# Patient Record
Sex: FEMALE | Race: WHITE | Employment: FULL TIME | URBAN - NONMETROPOLITAN AREA
[De-identification: names, ages, dates, MRNs, and addresses within clinical notes are randomized per-mention and may not be internally consistent; named-entity substitution may affect disease eponyms.]

---

## 2017-01-31 ENCOUNTER — OFFICE VISIT (OUTPATIENT)
Dept: CARDIOLOGY | Age: 52
End: 2017-01-31
Payer: MEDICAID

## 2017-01-31 VITALS
SYSTOLIC BLOOD PRESSURE: 110 MMHG | DIASTOLIC BLOOD PRESSURE: 70 MMHG | HEART RATE: 70 BPM | WEIGHT: 158 LBS | HEIGHT: 65 IN | BODY MASS INDEX: 26.33 KG/M2

## 2017-01-31 DIAGNOSIS — I47.1 AVNRT (AV NODAL RE-ENTRY TACHYCARDIA) (HCC): Primary | ICD-10-CM

## 2017-01-31 PROCEDURE — 99212 OFFICE O/P EST SF 10 MIN: CPT | Performed by: INTERNAL MEDICINE

## 2017-04-24 ENCOUNTER — APPOINTMENT (OUTPATIENT)
Dept: MAMMOGRAPHY | Facility: HOSPITAL | Age: 52
End: 2017-04-24

## 2017-04-25 ENCOUNTER — HOSPITAL ENCOUNTER (OUTPATIENT)
Dept: MAMMOGRAPHY | Facility: HOSPITAL | Age: 52
Discharge: HOME | End: 2017-04-25
Admitting: PHYSICIAN ASSISTANT

## 2017-04-25 ENCOUNTER — TELEPHONE (OUTPATIENT)
Dept: SURGERY | Age: 52
End: 2017-04-25

## 2017-04-25 DIAGNOSIS — Z13.9 SCREENING: ICD-10-CM

## 2017-04-25 PROCEDURE — G0202 SCR MAMMO BI INCL CAD: HCPCS

## 2017-04-25 PROCEDURE — 77063 BREAST TOMOSYNTHESIS BI: CPT

## 2017-05-01 ENCOUNTER — OFFICE VISIT (OUTPATIENT)
Dept: SURGERY | Age: 52
End: 2017-05-01
Payer: MEDICAID

## 2017-05-01 VITALS — SYSTOLIC BLOOD PRESSURE: 120 MMHG | DIASTOLIC BLOOD PRESSURE: 74 MMHG | HEART RATE: 72 BPM

## 2017-05-01 DIAGNOSIS — Z12.31 VISIT FOR SCREENING MAMMOGRAM: ICD-10-CM

## 2017-05-01 PROCEDURE — 99212 OFFICE O/P EST SF 10 MIN: CPT | Performed by: PHYSICIAN ASSISTANT

## 2017-08-28 ENCOUNTER — OFFICE VISIT (OUTPATIENT)
Dept: CARDIOLOGY | Age: 52
End: 2017-08-28
Payer: MEDICAID

## 2017-08-28 VITALS
SYSTOLIC BLOOD PRESSURE: 118 MMHG | DIASTOLIC BLOOD PRESSURE: 80 MMHG | WEIGHT: 176 LBS | HEART RATE: 70 BPM | HEIGHT: 67 IN | BODY MASS INDEX: 27.62 KG/M2

## 2017-08-28 DIAGNOSIS — I47.1 AVNRT (AV NODAL RE-ENTRY TACHYCARDIA) (HCC): Primary | ICD-10-CM

## 2017-08-28 PROCEDURE — 99213 OFFICE O/P EST LOW 20 MIN: CPT | Performed by: CLINICAL NURSE SPECIALIST

## 2017-08-28 ASSESSMENT — ENCOUNTER SYMPTOMS
ORTHOPNEA: 0
BLURRED VISION: 0
NAUSEA: 0
VOMITING: 0
SHORTNESS OF BREATH: 0
HEARTBURN: 0
COUGH: 0

## 2018-02-28 ENCOUNTER — OFFICE VISIT (OUTPATIENT)
Dept: CARDIOLOGY | Age: 53
End: 2018-02-28
Payer: MEDICAID

## 2018-02-28 ENCOUNTER — TELEPHONE (OUTPATIENT)
Dept: CARDIOLOGY | Age: 53
End: 2018-02-28

## 2018-02-28 VITALS
BODY MASS INDEX: 29.41 KG/M2 | DIASTOLIC BLOOD PRESSURE: 68 MMHG | HEIGHT: 66 IN | WEIGHT: 183 LBS | HEART RATE: 45 BPM | SYSTOLIC BLOOD PRESSURE: 122 MMHG

## 2018-02-28 DIAGNOSIS — R00.1 BRADYCARDIA: ICD-10-CM

## 2018-02-28 DIAGNOSIS — I47.1 AVNRT (AV NODAL RE-ENTRY TACHYCARDIA) (HCC): Primary | ICD-10-CM

## 2018-02-28 DIAGNOSIS — R00.2 PALPITATIONS: ICD-10-CM

## 2018-02-28 PROCEDURE — G8427 DOCREV CUR MEDS BY ELIG CLIN: HCPCS | Performed by: CLINICAL NURSE SPECIALIST

## 2018-02-28 PROCEDURE — 99214 OFFICE O/P EST MOD 30 MIN: CPT | Performed by: CLINICAL NURSE SPECIALIST

## 2018-02-28 PROCEDURE — G8484 FLU IMMUNIZE NO ADMIN: HCPCS | Performed by: CLINICAL NURSE SPECIALIST

## 2018-02-28 PROCEDURE — 1036F TOBACCO NON-USER: CPT | Performed by: CLINICAL NURSE SPECIALIST

## 2018-02-28 PROCEDURE — G8419 CALC BMI OUT NRM PARAM NOF/U: HCPCS | Performed by: CLINICAL NURSE SPECIALIST

## 2018-02-28 PROCEDURE — 3017F COLORECTAL CA SCREEN DOC REV: CPT | Performed by: CLINICAL NURSE SPECIALIST

## 2018-02-28 PROCEDURE — 3014F SCREEN MAMMO DOC REV: CPT | Performed by: CLINICAL NURSE SPECIALIST

## 2018-02-28 PROCEDURE — 93000 ELECTROCARDIOGRAM COMPLETE: CPT | Performed by: CLINICAL NURSE SPECIALIST

## 2018-02-28 ASSESSMENT — ENCOUNTER SYMPTOMS
BLURRED VISION: 0
HEARTBURN: 0
ORTHOPNEA: 0
NAUSEA: 0
SHORTNESS OF BREATH: 0
VOMITING: 0
COUGH: 0

## 2018-02-28 NOTE — PATIENT INSTRUCTIONS
good idea to know your test results and keep a list of the medicines you take. How can you care for yourself at home? · Be safe with medicines. Take your medicines exactly as prescribed. Call your doctor if you think you are having a problem with your medicine. You will get more details on the specific medicines your doctor prescribes. · If your doctor showed you how to do vagal maneuvers, try them when you have an episode. These maneuvers include bearing down or putting an ice-cold, wet towel on your face. · Monitor your condition by keeping a diary of your SVT episodes. Bring this to your doctor appointments. ¨ Write down how fast or slow your heart was beating. To count your heart rate:  § Gently place 2 fingers of your hand on the inside of your other wrist, below your thumb. § Count the beats for 30 seconds. § Then, double the result to get the number of beats per minute. ¨ Write down if your heart rhythm was regular or irregular. ¨ Write down the symptoms you had. ¨ Write down the time of day your symptoms occurred. ¨ Write down how long your symptoms lasted. ¨ Write down what you were doing when your symptoms started. ¨ Write down what may have helped your symptoms go away. · If they trigger episodes, limit or avoid alcohol or drinks with caffeine. · Do not use over-the-counter decongestants, herbal remedies, diet pills, or \"pep\" pills, which often contain stimulants. · Do not use illegal drugs, such as cocaine, ecstasy, or methamphetamine, which can speed up your heart's rhythm. · Do not smoke. Smoking can make this condition worse. If you need help quitting, talk to your doctor about stop-smoking programs and medicines. These can increase your chances of quitting for good. · Be alert for new or worsening symptoms, such as shortness of breath, pounding of your heart, or unusual tiredness. If new symptoms develop or your symptoms become worse, call your doctor.   When should you call for

## 2018-03-21 ENCOUNTER — TRANSCRIBE ORDERS (OUTPATIENT)
Dept: ADMINISTRATIVE | Facility: HOSPITAL | Age: 53
End: 2018-03-21

## 2018-03-21 ENCOUNTER — TELEPHONE (OUTPATIENT)
Dept: SURGERY | Age: 53
End: 2018-03-21

## 2018-03-21 DIAGNOSIS — Z12.31 ENCOUNTER FOR SCREENING MAMMOGRAM FOR MALIGNANT NEOPLASM OF BREAST: Primary | ICD-10-CM

## 2018-04-16 ENCOUNTER — HOSPITAL ENCOUNTER (OUTPATIENT)
Dept: CT IMAGING | Facility: HOSPITAL | Age: 53
Discharge: HOME | End: 2018-04-16
Admitting: NURSE PRACTITIONER

## 2018-04-16 LAB — CREAT BLDA-MCNC: 0.7 MG/DL (ref 0.6–1.3)

## 2018-04-16 PROCEDURE — 25010000002 IOPAMIDOL 61 % SOLUTION: Performed by: NURSE PRACTITIONER

## 2018-04-16 PROCEDURE — 82565 ASSAY OF CREATININE: CPT

## 2018-04-16 PROCEDURE — 74178 CT ABD&PLV WO CNTR FLWD CNTR: CPT

## 2018-04-16 RX ADMIN — IOPAMIDOL 100 ML: 612 INJECTION, SOLUTION INTRAVENOUS at 12:15

## 2018-04-26 ENCOUNTER — OFFICE VISIT (OUTPATIENT)
Dept: SURGERY | Age: 53
End: 2018-04-26
Payer: MEDICAID

## 2018-04-26 ENCOUNTER — HOSPITAL ENCOUNTER (OUTPATIENT)
Dept: MAMMOGRAPHY | Facility: HOSPITAL | Age: 53
Discharge: HOME | End: 2018-04-26
Admitting: PHYSICIAN ASSISTANT

## 2018-04-26 VITALS
BODY MASS INDEX: 28.73 KG/M2 | SYSTOLIC BLOOD PRESSURE: 100 MMHG | WEIGHT: 178.8 LBS | DIASTOLIC BLOOD PRESSURE: 60 MMHG | HEIGHT: 66 IN

## 2018-04-26 DIAGNOSIS — Z12.39 SCREENING BREAST EXAMINATION: Primary | ICD-10-CM

## 2018-04-26 DIAGNOSIS — Z12.31 ENCOUNTER FOR SCREENING MAMMOGRAM FOR MALIGNANT NEOPLASM OF BREAST: ICD-10-CM

## 2018-04-26 PROCEDURE — 77067 SCR MAMMO BI INCL CAD: CPT

## 2018-04-26 PROCEDURE — G8419 CALC BMI OUT NRM PARAM NOF/U: HCPCS | Performed by: PHYSICIAN ASSISTANT

## 2018-04-26 PROCEDURE — 77063 BREAST TOMOSYNTHESIS BI: CPT

## 2018-04-26 PROCEDURE — 1036F TOBACCO NON-USER: CPT | Performed by: PHYSICIAN ASSISTANT

## 2018-04-26 PROCEDURE — 3017F COLORECTAL CA SCREEN DOC REV: CPT | Performed by: PHYSICIAN ASSISTANT

## 2018-04-26 PROCEDURE — 99212 OFFICE O/P EST SF 10 MIN: CPT | Performed by: PHYSICIAN ASSISTANT

## 2018-04-26 PROCEDURE — G8427 DOCREV CUR MEDS BY ELIG CLIN: HCPCS | Performed by: PHYSICIAN ASSISTANT

## 2018-04-30 DIAGNOSIS — Z12.31 VISIT FOR SCREENING MAMMOGRAM: ICD-10-CM

## 2018-05-07 ENCOUNTER — TELEPHONE (OUTPATIENT)
Dept: CARDIOLOGY | Age: 53
End: 2018-05-07

## 2018-05-16 ENCOUNTER — OFFICE VISIT (OUTPATIENT)
Dept: CARDIOLOGY | Age: 53
End: 2018-05-16
Payer: MEDICAID

## 2018-05-16 VITALS
WEIGHT: 177 LBS | HEART RATE: 54 BPM | BODY MASS INDEX: 28.45 KG/M2 | DIASTOLIC BLOOD PRESSURE: 78 MMHG | HEIGHT: 66 IN | SYSTOLIC BLOOD PRESSURE: 120 MMHG

## 2018-05-16 DIAGNOSIS — R00.2 PALPITATIONS: Primary | ICD-10-CM

## 2018-05-16 DIAGNOSIS — I47.1 AVNRT (AV NODAL RE-ENTRY TACHYCARDIA) (HCC): ICD-10-CM

## 2018-05-16 PROCEDURE — 3017F COLORECTAL CA SCREEN DOC REV: CPT | Performed by: NURSE PRACTITIONER

## 2018-05-16 PROCEDURE — G8427 DOCREV CUR MEDS BY ELIG CLIN: HCPCS | Performed by: NURSE PRACTITIONER

## 2018-05-16 PROCEDURE — 99213 OFFICE O/P EST LOW 20 MIN: CPT | Performed by: NURSE PRACTITIONER

## 2018-05-16 PROCEDURE — 1036F TOBACCO NON-USER: CPT | Performed by: NURSE PRACTITIONER

## 2018-05-16 PROCEDURE — G8419 CALC BMI OUT NRM PARAM NOF/U: HCPCS | Performed by: NURSE PRACTITIONER

## 2018-05-16 PROCEDURE — 93000 ELECTROCARDIOGRAM COMPLETE: CPT | Performed by: NURSE PRACTITIONER

## 2018-05-30 ENCOUNTER — TELEPHONE (OUTPATIENT)
Dept: CARDIOLOGY | Age: 53
End: 2018-05-30

## 2018-06-15 ENCOUNTER — TELEPHONE (OUTPATIENT)
Dept: CARDIOLOGY | Age: 53
End: 2018-06-15

## 2018-06-28 ENCOUNTER — TELEPHONE (OUTPATIENT)
Dept: CARDIOLOGY | Age: 53
End: 2018-06-28

## 2018-06-29 DIAGNOSIS — R00.2 PALPITATIONS: ICD-10-CM

## 2018-06-29 DIAGNOSIS — I47.1 AVNRT (AV NODAL RE-ENTRY TACHYCARDIA) (HCC): ICD-10-CM

## 2018-07-10 ENCOUNTER — TELEPHONE (OUTPATIENT)
Dept: CARDIOLOGY | Age: 53
End: 2018-07-10

## 2018-08-28 ENCOUNTER — HOSPITAL ENCOUNTER (OUTPATIENT)
Dept: GENERAL RADIOLOGY | Facility: HOSPITAL | Age: 53
Discharge: HOME | End: 2018-08-28
Admitting: NURSE PRACTITIONER

## 2018-08-28 PROCEDURE — 85379 FIBRIN DEGRADATION QUANT: CPT | Performed by: NURSE PRACTITIONER

## 2018-08-28 PROCEDURE — 73562 X-RAY EXAM OF KNEE 3: CPT

## 2018-08-29 ENCOUNTER — HOSPITAL ENCOUNTER (OUTPATIENT)
Dept: ULTRASOUND IMAGING | Facility: HOSPITAL | Age: 53
Discharge: HOME | End: 2018-08-29
Admitting: NURSE PRACTITIONER

## 2018-08-29 DIAGNOSIS — M25.562 ACUTE PAIN OF LEFT KNEE: ICD-10-CM

## 2018-08-29 PROCEDURE — 93971 EXTREMITY STUDY: CPT

## 2018-09-05 ENCOUNTER — TELEPHONE (OUTPATIENT)
Dept: CARDIOLOGY | Age: 53
End: 2018-09-05

## 2018-10-09 ENCOUNTER — OFFICE VISIT (OUTPATIENT)
Dept: CARDIOLOGY | Facility: CLINIC | Age: 53
End: 2018-10-09

## 2018-10-09 VITALS
HEART RATE: 48 BPM | HEIGHT: 67 IN | DIASTOLIC BLOOD PRESSURE: 71 MMHG | SYSTOLIC BLOOD PRESSURE: 100 MMHG | BODY MASS INDEX: 28.25 KG/M2 | WEIGHT: 180 LBS

## 2018-10-09 DIAGNOSIS — I47.1 SVT (SUPRAVENTRICULAR TACHYCARDIA) (HCC): Primary | ICD-10-CM

## 2018-10-09 PROBLEM — I47.10 SVT (SUPRAVENTRICULAR TACHYCARDIA): Status: ACTIVE | Noted: 2018-10-09

## 2018-10-09 PROCEDURE — 99204 OFFICE O/P NEW MOD 45 MIN: CPT | Performed by: INTERNAL MEDICINE

## 2018-10-09 PROCEDURE — 93000 ELECTROCARDIOGRAM COMPLETE: CPT | Performed by: INTERNAL MEDICINE

## 2018-10-09 NOTE — PROGRESS NOTES
Konrad Mock is a 53 y.o. female. - REFERRED by pcp with SVT and request to change cardiologist    History of Present Illness     PAR SVT:  Has had this since at least '16 when she first presented to  with SVT and had Adenosine IVP in the ER which quickly restored sinus rhythm. Subsequent stress testing was neg and she has been on Metoprolol since. 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 Still having palpitations 2-3X/wk for short duration and recent long-term monitoring showed continued short bursts of svt and some short runs of wide-complex beats. She knows to avoid stimulants. Her dose of Metoprolol has been increased but she feels that this is the cause of her worsening fatigue and slow HR. Today's EKG is sinus sandip at 48 bpm. Her thyroid levels are in the normal range by report.    The following portions of the patient's history were reviewed and updated as appropriate: allergies, current medications, past family history, past medical history, past social history, past surgical history and problem list.    Patient Active Problem List   Diagnosis   • Colon cancer screening   • SVT (supraventricular tachycardia) (CMS/HCC)       No Known Allergies    Family History   Problem Relation Age of Onset   • Crohn's disease Mother    • Irritable bowel syndrome Mother    • Cancer Father    • Breast cancer Maternal Aunt    • Celiac disease Neg Hx    • Cirrhosis Neg Hx    • Colon cancer Neg Hx    • Esophageal cancer Neg Hx    • Liver cancer Neg Hx    • Liver disease Neg Hx    • Rectal cancer Neg Hx    • Stomach cancer Neg Hx        Social History     Social History   • Marital status:      Spouse name: N/A   • Number of children: N/A   • Years of education: N/A     Occupational History   • Not on file.     Social History Main Topics   • Smoking status: Never Smoker   • Smokeless tobacco: Never Used   • Alcohol use Yes      Comment: rare   • Drug use: No   • Sexual activity: Not on  file     Other Topics Concern   • Not on file     Social History Narrative   • No narrative on file         Current Outpatient Prescriptions:   •  Biotin 7500 MCG tablet, Take  by mouth., Disp: , Rfl:   •  Calcium Carbonate-Vit D-Min (CALTRATE 600+D PLUS PO), Take 600 mg by mouth daily., Disp: , Rfl:   •  Glucosamine-Chondroit-Vit C-Mn (GLUCOSAMINE 1500 COMPLEX) capsule, Take 400 mg by mouth daily., Disp: , Rfl:   •  levothyroxine (SYNTHROID, LEVOTHROID) 50 MCG tablet, Take 50 mcg by mouth daily., Disp: , Rfl:   •  loratadine (CLARITIN) 10 MG tablet, Take 10 mg by mouth daily., Disp: , Rfl:   •  MethylPREDNISolone (MEDROL, SHALOM,) 4 MG tablet, Take as directed on package instructions., Disp: 21 tablet, Rfl: 0  •  metoprolol tartrate (LOPRESSOR) 25 MG tablet, Take 37.5 mg by mouth 2 (Two) Times a Day., Disp: , Rfl:   •  mometasone (NASONEX) 50 MCG/ACT nasal spray, 2 sprays into each nostril daily., Disp: , Rfl:   •  sertraline (ZOLOFT) 50 MG tablet, Take 50 mg by mouth daily., Disp: , Rfl:     Past Surgical History:   Procedure Laterality Date   • BREAST BIOPSY Bilateral 2005, 2014    benign   • BREAST EXCISIONAL BIOPSY Left 2014    benign   • COLONOSCOPY  10/28/2016    diverticulosis   • COLONOSCOPY N/A 10/28/2016    Procedure: COLONOSCOPY;  Surgeon: Marisol Kee MD;  Location: [facility] ENDOSCOPY;  Service:    • HYSTERECTOMY  2012       Review of Systems   Constitutional: Positive for fatigue. Negative for fever and unexpected weight change.   HENT: Negative for congestion.    Eyes: Negative for visual disturbance.   Respiratory: Negative for apnea, shortness of breath and wheezing.    Cardiovascular: Positive for palpitations. Negative for chest pain and leg swelling.   Gastrointestinal: Negative for abdominal pain and vomiting.   Endocrine: Negative for cold intolerance and heat intolerance.   Genitourinary: Negative for difficulty urinating.   Musculoskeletal: Positive for arthralgias. Negative for myalgias.  "  Skin: Negative for rash.   Neurological: Negative for syncope.   Hematological: Does not bruise/bleed easily.   Psychiatric/Behavioral: Positive for sleep disturbance.       /71   Pulse (!) 48   Ht 168.9 cm (66.5\")   Wt 81.6 kg (180 lb)   BMI 28.62 kg/m²   Procedures    Objective   Physical Exam   Constitutional: She is oriented to person, place, and time. She appears well-developed and well-nourished. No distress.   HENT:   Head: Normocephalic.   Eyes: Pupils are equal, round, and reactive to light.   Neck: No thyromegaly present.   Cardiovascular: Regular rhythm, normal heart sounds and intact distal pulses.  Bradycardia present.  Exam reveals no gallop and no friction rub.    No murmur heard.  Pulmonary/Chest: Effort normal and breath sounds normal. No respiratory distress. She has no wheezes. She has no rales.   Abdominal: Soft. Bowel sounds are normal. She exhibits no distension. There is no tenderness. There is no guarding.   Musculoskeletal: She exhibits no edema or tenderness.   Neurological: She is alert and oriented to person, place, and time.   Skin: Skin is warm and dry. She is not diaphoretic.   Psychiatric:   Affect is somewhat flattened       Assessment/Plan   Lucero was seen today for rapid heart rate.    Diagnoses and all orders for this visit:    SVT (supraventricular tachycardia) (CMS/[facility])  Comments:  POSS AN ABLATABLE ISSUE - INTOLERABLE SIDE EFFECTS WITH BETA-BLOCKER - reduce dose of Metoprolol and use additional on a prn basis. EP referral.  Orders:  -     ECG 12 Lead  -     Ambulatory Referral to Cardiac Electrophysiology                 Return if symptoms worsen or fail to improve.  Orders Placed This Encounter   Procedures   • Ambulatory Referral to Cardiac Electrophysiology     Referral Priority:   Routine     Referral Type:   Consultation     Referral Reason:   Specialty Services Required     Requested Specialty:   Cardiac Electrophysiology     Number of Visits Requested:   1 "   • ECG 12 Lead     Order Specific Question:   Reason for Exam:     Answer:   svt

## 2018-12-13 RX ORDER — METOPROLOL SUCCINATE 50 MG/1
50 TABLET, EXTENDED RELEASE ORAL 2 TIMES DAILY
Qty: 30 TABLET | Refills: 11 | Status: SHIPPED | OUTPATIENT
Start: 2018-12-13 | End: 2018-12-13

## 2019-02-14 ENCOUNTER — TRANSCRIBE ORDERS (OUTPATIENT)
Dept: ADMINISTRATIVE | Facility: HOSPITAL | Age: 54
End: 2019-02-14

## 2019-02-14 DIAGNOSIS — Z12.39 SCREENING BREAST EXAMINATION: Primary | ICD-10-CM

## 2019-03-06 ENCOUNTER — NURSE TRIAGE (OUTPATIENT)
Dept: CALL CENTER | Facility: HOSPITAL | Age: 54
End: 2019-03-06

## 2019-03-06 NOTE — TELEPHONE ENCOUNTER
"Started on new medications yesterday, for vertigo, wanting to know how long it would take for meds   To help, her symptoms had not improved, informed would take at least 3-5 days    Reason for Disposition  • Caller has medication question only, adult not sick, and triager answers question    Additional Information  • Negative: Drug overdose and nurse unable to answer question  • Negative: Caller requesting information not related to medicine  • Negative: Caller requesting a prescription for Strep throat and has a positive culture result  • Negative: Rash while taking a medication or within 3 days of stopping it  • Negative: Immunization reaction suspected  • Negative: [1] Asthma and [2] having symptoms of asthma (cough, wheezing, etc)  • Negative: MORE THAN A DOUBLE DOSE of a prescription or over-the-counter (OTC) drug  • Negative: [1] DOUBLE DOSE (an extra dose or lesser amount) of over-the-counter (OTC) drug AND [2] any symptoms (e.g., dizziness, nausea, pain, sleepiness)  • Negative: [1] DOUBLE DOSE (an extra dose or lesser amount) of prescription drug AND [2] any symptoms (e.g., dizziness, nausea, pain, sleepiness)  • Negative: Took another person's prescription drug  • Negative: [1] DOUBLE DOSE (an extra dose or lesser amount) of prescription drug AND [2] NO symptoms (Exception: a double dose of antibiotics)  • Negative: Diabetes drug error or overdose (e.g., insulin or extra dose)  • Negative: [1] Request for URGENT new prescription or refill of \"essential\" medication (i.e., likelihood of harm to patient if not taken) AND [2] triager unable to fill per unit policy  • Negative: [1] Prescription not at pharmacy AND [2] was prescribed today by PCP  • Negative: Pharmacy calling with prescription questions and triager unable to answer question  • Negative: Caller has URGENT medication question about med that PCP prescribed and triager unable to answer question  • Negative: Caller has NON-URGENT medication question " "about med that PCP prescribed and triager unable to answer question  • Negative: Caller requesting a NON-URGENT new prescription or refill and triager unable to refill per unit policy  • Negative: Caller has medication question about med not prescribed by PCP and triager unable to answer question (e.g., compatibility with other med, storage)  • Negative: [1] DOUBLE DOSE (an extra dose or lesser amount) of over-the-counter (OTC) drug AND [2] NO symptoms  • Negative: [1] DOUBLE DOSE (an extra dose or lesser amount) of antibiotic drug AND [2] NO symptoms    Answer Assessment - Initial Assessment Questions  1. SYMPTOMS: \"Do you have any symptoms?\"      vertigo  2. SEVERITY: If symptoms are present, ask \"Are they mild, moderate or severe?\"      moderate    Protocols used: MEDICATION QUESTION CALL-ADULT-      "

## 2019-03-15 ENCOUNTER — TRANSCRIBE ORDERS (OUTPATIENT)
Dept: ADMINISTRATIVE | Facility: HOSPITAL | Age: 54
End: 2019-03-15

## 2019-03-15 DIAGNOSIS — R51.9 ACUTE NONINTRACTABLE HEADACHE, UNSPECIFIED HEADACHE TYPE: ICD-10-CM

## 2019-03-15 DIAGNOSIS — R42 DIZZINESS: Primary | ICD-10-CM

## 2019-03-19 ENCOUNTER — HOSPITAL ENCOUNTER (OUTPATIENT)
Dept: MRI IMAGING | Facility: HOSPITAL | Age: 54
Discharge: HOME | End: 2019-03-19
Admitting: FAMILY MEDICINE

## 2019-03-19 DIAGNOSIS — R42 DIZZINESS: ICD-10-CM

## 2019-03-19 DIAGNOSIS — R51.9 ACUTE NONINTRACTABLE HEADACHE, UNSPECIFIED HEADACHE TYPE: ICD-10-CM

## 2019-03-19 LAB — CREAT BLDA-MCNC: 1 MG/DL (ref 0.6–1.3)

## 2019-03-19 PROCEDURE — 70553 MRI BRAIN STEM W/O & W/DYE: CPT

## 2019-03-19 PROCEDURE — 82565 ASSAY OF CREATININE: CPT

## 2019-03-19 PROCEDURE — 0 GADOBENATE DIMEGLUMINE 529 MG/ML SOLUTION: Performed by: FAMILY MEDICINE

## 2019-03-19 PROCEDURE — A9577 INJ MULTIHANCE: HCPCS | Performed by: FAMILY MEDICINE

## 2019-03-19 RX ADMIN — GADOBENATE DIMEGLUMINE 10 ML: 529 INJECTION, SOLUTION INTRAVENOUS at 15:30

## 2019-04-29 ENCOUNTER — OFFICE VISIT (OUTPATIENT)
Dept: SURGERY | Age: 54
End: 2019-04-29
Payer: MEDICAID

## 2019-04-29 ENCOUNTER — HOSPITAL ENCOUNTER (OUTPATIENT)
Dept: MAMMOGRAPHY | Facility: HOSPITAL | Age: 54
Discharge: HOME | End: 2019-04-29
Admitting: PHYSICIAN ASSISTANT

## 2019-04-29 VITALS — BODY MASS INDEX: 30.53 KG/M2 | WEIGHT: 190 LBS | TEMPERATURE: 97.7 F | HEIGHT: 66 IN

## 2019-04-29 DIAGNOSIS — Z12.39 SCREENING BREAST EXAMINATION: Primary | ICD-10-CM

## 2019-04-29 PROCEDURE — G8419 CALC BMI OUT NRM PARAM NOF/U: HCPCS | Performed by: PHYSICIAN ASSISTANT

## 2019-04-29 PROCEDURE — 77067 SCR MAMMO BI INCL CAD: CPT

## 2019-04-29 PROCEDURE — 3017F COLORECTAL CA SCREEN DOC REV: CPT | Performed by: PHYSICIAN ASSISTANT

## 2019-04-29 PROCEDURE — G8427 DOCREV CUR MEDS BY ELIG CLIN: HCPCS | Performed by: PHYSICIAN ASSISTANT

## 2019-04-29 PROCEDURE — 77063 BREAST TOMOSYNTHESIS BI: CPT

## 2019-04-29 PROCEDURE — 1036F TOBACCO NON-USER: CPT | Performed by: PHYSICIAN ASSISTANT

## 2019-04-29 PROCEDURE — 99213 OFFICE O/P EST LOW 20 MIN: CPT | Performed by: PHYSICIAN ASSISTANT

## 2019-04-29 RX ORDER — PANTOPRAZOLE SODIUM 20 MG/1
20 TABLET, DELAYED RELEASE ORAL DAILY
COMMUNITY

## 2019-05-12 NOTE — PROGRESS NOTES
HPI:  Carlos Barajas is in for yearly follow-up breast check. She has not noticed any changes in her breasts. Result Impression   1. Benign mammogram. BI-RADS 2.  2. Screening mammography recommended in one year. 3. Computer aided detection was utilized. 3-D Tomosynthesis was  performed. This report was finalized on 04/29/2019 13:44 by Dr. Padilla Mcallister MD.   Result Narrative   EXAMINATION:  MAMMO SCREENING DIGITAL TOMOSYNTHESIS BILATERAL W CAD-   4/29/2019 1:10 PM CDT    CLINICAL HISTORY: Hyacinth Jha for screening mammogram for malignant  neoplasm of breast. Prior bilateral benign breast biopsies. There is a  family history of breast cancer in a maternal aunt. COMPARISON STUDIES: 04/26/2018, 4/25/2017 and 4/21/2016. BREAST DENSITY: There are scattered fibroglandular densities that may  lower the sensitivity of mammography (Pattern B). TECHNIQUE: Digital mammography was performed. 3-D Tomosynthesis was  performed. FINDINGS: There is stable postsurgical scarring in the left breast at  12:00. There is a stable 1 cm nodule in the medial inferior right breast  posteriorly with coarse calcification and consistent with a  fibroadenoma. There is a marker clip in the medial right breast. There  are no new masses. There are other scattered benign calcifications. There is no skin thickening. BREAST EXAM:  On examination, she has fibrocystic changes throughout both breasts, no dominant masses, no skin or nipple changes and no axillary adenopathy. I see nothing suspicious for breast cancer. ASSESSMENT:  Benign fibrocystic changes                                DISCUSSION:  I have stressed the importance of self breast exam and have explained the technique to her. We also discussed the pathophysiology of fibrocystic disease and breast cancer. She expresses good understanding. We also discussed multiple other issues regarding her and her family's health.       PLAN:  I will plan to see her back in 1 year for physical exam and mammograms. She will contact me if anything significant changes. I spent over 50% of visit time counseling patient. 15 minutes of face to face time with patient.

## 2019-05-31 ENCOUNTER — HOSPITAL ENCOUNTER (OUTPATIENT)
Dept: GENERAL RADIOLOGY | Facility: HOSPITAL | Age: 54
Discharge: HOME | End: 2019-05-31
Admitting: NURSE PRACTITIONER

## 2019-05-31 ENCOUNTER — HOSPITAL ENCOUNTER (OUTPATIENT)
Dept: CT IMAGING | Facility: HOSPITAL | Age: 54
Discharge: HOME | End: 2019-05-31

## 2019-05-31 PROCEDURE — 74022 RADEX COMPL AQT ABD SERIES: CPT

## 2019-05-31 PROCEDURE — 74176 CT ABD & PELVIS W/O CONTRAST: CPT

## 2019-06-04 ENCOUNTER — TELEPHONE (OUTPATIENT)
Dept: URGENT CARE | Facility: CLINIC | Age: 54
End: 2019-06-04

## 2019-06-04 NOTE — TELEPHONE ENCOUNTER
Pt called to say that she is not any better. Pt called her PCP and they could not see her til the 28th.  Pt states that she is still bloated and if she goes outside then she gets hot and sweaty and then she comes in vomits. Wicho MORA reviewed chart and pt was instructed to go to the ER.

## 2019-06-05 ENCOUNTER — HOSPITAL ENCOUNTER (EMERGENCY)
Facility: HOSPITAL | Age: 54
Discharge: HOME | End: 2019-06-05
Attending: EMERGENCY MEDICINE | Admitting: EMERGENCY MEDICINE

## 2019-06-05 VITALS
HEART RATE: 54 BPM | DIASTOLIC BLOOD PRESSURE: 75 MMHG | RESPIRATION RATE: 16 BRPM | WEIGHT: 182.6 LBS | SYSTOLIC BLOOD PRESSURE: 135 MMHG | BODY MASS INDEX: 29.35 KG/M2 | OXYGEN SATURATION: 99 % | HEIGHT: 66 IN | TEMPERATURE: 97.9 F

## 2019-06-05 DIAGNOSIS — K57.92 DIVERTICULITIS: Primary | ICD-10-CM

## 2019-06-05 PROCEDURE — 99283 EMERGENCY DEPT VISIT LOW MDM: CPT

## 2019-06-05 RX ORDER — ONDANSETRON 4 MG/1
4 TABLET, ORALLY DISINTEGRATING ORAL ONCE
Status: COMPLETED | OUTPATIENT
Start: 2019-06-05 | End: 2019-06-05

## 2019-06-05 RX ORDER — AMOXICILLIN AND CLAVULANATE POTASSIUM 875; 125 MG/1; MG/1
1 TABLET, FILM COATED ORAL EVERY 12 HOURS
Qty: 20 TABLET | Refills: 0 | Status: SHIPPED | OUTPATIENT
Start: 2019-06-05 | End: 2019-06-15

## 2019-06-05 RX ORDER — ONDANSETRON 4 MG/1
4 TABLET, FILM COATED ORAL EVERY 6 HOURS
Qty: 15 TABLET | Refills: 0 | Status: SHIPPED | OUTPATIENT
Start: 2019-06-05 | End: 2023-03-14 | Stop reason: SDUPTHER

## 2019-06-05 RX ADMIN — ONDANSETRON 4 MG: 4 TABLET, ORALLY DISINTEGRATING ORAL at 11:31

## 2019-06-05 NOTE — DISCHARGE INSTRUCTIONS
Diverticulitis  Diverticulitis is infection or inflammation of small pouches (diverticula) in the colon that form due to a condition called diverticulosis. Diverticula can trap stool (feces) and bacteria, causing infection and inflammation.  Diverticulitis may cause severe stomach pain and diarrhea. It may lead to tissue damage in the colon that causes bleeding. The diverticula may also burst (rupture) and cause infected stool to enter other areas of the abdomen.  Complications of diverticulitis can include:  · Bleeding.  · Severe infection.  · Severe pain.  · Rupture (perforation) of the colon.  · Blockage (obstruction) of the colon.    What are the causes?  This condition is caused by stool becoming trapped in the diverticula, which allows bacteria to grow in the diverticula. This leads to inflammation and infection.  What increases the risk?  You are more likely to develop this condition if:  · You have diverticulosis. The risk for diverticulosis increases if:  ? You are overweight or obese.  ? You use tobacco products.  ? You do not get enough exercise.  · You eat a diet that does not include enough fiber. High-fiber foods include fruits, vegetables, beans, nuts, and whole grains.    What are the signs or symptoms?  Symptoms of this condition may include:  · Pain and tenderness in the abdomen. The pain is normally located on the left side of the abdomen, but it may occur in other areas.  · Fever and chills.  · Bloating.  · Cramping.  · Nausea.  · Vomiting.  · Changes in bowel routines.  · Blood in your stool.    How is this diagnosed?  This condition is diagnosed based on:  · Your medical history.  · A physical exam.  · Tests to make sure there is nothing else causing your condition. These tests may include:  ? Blood tests.  ? Urine tests.  ? Imaging tests of the abdomen, including X-rays, ultrasounds, MRIs, or CT scans.    How is this treated?  Most cases of this condition are mild and can be treated at home.  Treatment may include:  · Taking over-the-counter pain medicines.  · Following a clear liquid diet.  · Taking antibiotic medicines by mouth.  · Rest.    More severe cases may need to be treated at a hospital. Treatment may include:  · Not eating or drinking.  · Taking prescription pain medicine.  · Receiving antibiotic medicines through an IV tube.  · Receiving fluids and nutrition through an IV tube.  · Surgery.    When your condition is under control, your health care provider may recommend that you have a colonoscopy. This is an exam to look at the entire large intestine. During the exam, a lubricated, bendable tube is inserted into the anus and then passed into the rectum, colon, and other parts of the large intestine. A colonoscopy can show how severe your diverticula are and whether something else may be causing your symptoms.  Follow these instructions at home:  Medicines  · Take over-the-counter and prescription medicines only as told by your health care provider. These include fiber supplements, probiotics, and stool softeners.  · If you were prescribed an antibiotic medicine, take it as told by your health care provider. Do not stop taking the antibiotic even if you start to feel better.  · Do not drive or use heavy machinery while taking prescription pain medicine.  General instructions  · Follow a full liquid diet or another diet as directed by your health care provider. After your symptoms improve, your health care provider may tell you to change your diet. He or she may recommend that you eat a diet that contains at least 25 g (25 grams) of fiber daily. Fiber makes it easier to pass stool. Healthy sources of fiber include:  ? Berries. One cup contains 4-8 grams of fiber.  ? Beans or lentils. One half cup contains 5-8 grams of fiber.  ? Green vegetables. One cup contains 4 grams of fiber.  · Exercise for at least 30 minutes, 3 times each week. You should exercise hard enough to raise your heart rate and  break a sweat.  · Keep all follow-up visits as told by your health care provider. This is important. You may need a colonoscopy.  Contact a health care provider if:  · Your pain does not improve.  · You have a hard time drinking or eating food.  · Your bowel movements do not return to normal.  Get help right away if:  · Your pain gets worse.  · Your symptoms do not get better with treatment.  · Your symptoms suddenly get worse.  · You have a fever.  · You vomit more than one time.  · You have stools that are bloody, black, or tarry.  Summary  · Diverticulitis is infection or inflammation of small pouches (diverticula) in the colon that form due to a condition called diverticulosis. Diverticula can trap stool (feces) and bacteria, causing infection and inflammation.  · You are at higher risk for this condition if you have diverticulosis and you eat a diet that does not include enough fiber.  · Most cases of this condition are mild and can be treated at home. More severe cases may need to be treated at a hospital.  · When your condition is under control, your health care provider may recommend that you have an exam called a colonoscopy. This exam can show how severe your diverticula are and whether something else may be causing your symptoms.  This information is not intended to replace advice given to you by your health care provider. Make sure you discuss any questions you have with your health care provider.  Document Released: 09/27/2006 Document Revised: 01/20/2018 Document Reviewed: 01/20/2018  [employer] Patient Education © 2019 [employer]

## 2019-06-05 NOTE — ED PROVIDER NOTES
Subjective   No acute distress is over here because cannot tolerate the Flagyl no worsening abdominal pain no nausea no vomiting except when taking Flagyl        Diverticulitis   Location:  Diagnosed diverticulitis  Severity:  Mild  Chronicity:  New  Associated symptoms: nausea and vomiting    Associated symptoms: no abdominal pain, no chest pain, no congestion, no cough, no diarrhea, no fatigue, no fever, no headaches, no loss of consciousness and no myalgias    Medication Reaction       Review of Systems   Constitutional: Negative.  Negative for fatigue and fever.   HENT: Negative.  Negative for congestion.    Eyes: Negative.    Respiratory: Negative.  Negative for cough.    Cardiovascular: Negative.  Negative for chest pain.   Gastrointestinal: Positive for nausea and vomiting. Negative for abdominal pain and diarrhea.   Endocrine: Negative.    Genitourinary: Negative.    Musculoskeletal: Negative for myalgias.   Skin: Negative.    Neurological: Negative.  Negative for loss of consciousness and headaches.   Hematological: Negative.    All other systems reviewed and are negative.      Past Medical History:   Diagnosis Date   • Anxiety    • Diabetes mellitus (CMS/HCC)    • Disease of thyroid gland    • Endometriosis    • Esophageal reflux    • Heart disease    • Hypoglycemia    • Panic disorder    • SVT (supraventricular tachycardia) (CMS/HCC)        No Known Allergies    Past Surgical History:   Procedure Laterality Date   • BREAST BIOPSY Bilateral 2005, 2014    benign   • BREAST EXCISIONAL BIOPSY Left 2014    benign   • CARPAL TUNNEL RELEASE     • COLONOSCOPY N/A 10/28/2016    Diverticulosis in the sigmoid colon; The examination was otherwise normal on direct and retroflexion views; No specimens collected; Repeat 10 years   • DIAGNOSTIC LAPAROSCOPY EXPLORATORY LAPAROTOMY      For endometriosis   • HYSTERECTOMY  2012       Family History   Problem Relation Age of Onset   • Crohn's disease Mother    • Irritable  bowel syndrome Mother    • Cancer Father    • Breast cancer Maternal Aunt    • Celiac disease Neg Hx    • Cirrhosis Neg Hx    • Colon cancer Neg Hx    • Esophageal cancer Neg Hx    • Liver cancer Neg Hx    • Liver disease Neg Hx    • Rectal cancer Neg Hx    • Stomach cancer Neg Hx        Social History     Socioeconomic History   • Marital status:      Spouse name: Not on file   • Number of children: Not on file   • Years of education: Not on file   • Highest education level: Not on file   Tobacco Use   • Smoking status: Never Smoker   • Smokeless tobacco: Never Used   Substance and Sexual Activity   • Alcohol use: Yes     Comment: rare   • Drug use: No           Objective   Physical Exam   Constitutional: She is oriented to person, place, and time. She appears well-developed and well-nourished.  Non-toxic appearance.   HENT:   Head: Normocephalic and atraumatic.   Mouth/Throat: Oropharynx is clear and moist.   Eyes: Conjunctivae are normal. Pupils are equal, round, and reactive to light.   Neck: Normal range of motion. Neck supple. No hepatojugular reflux and no JVD present.   Cardiovascular: Normal rate, regular rhythm, normal heart sounds and intact distal pulses. PMI is not displaced. Exam reveals no decreased pulses.   No murmur heard.  Pulmonary/Chest: Effort normal and breath sounds normal. No accessory muscle usage. No apnea. No respiratory distress. She has no decreased breath sounds. She has no wheezes.   Abdominal: Normal appearance, normal aorta and bowel sounds are normal. She exhibits no shifting dullness, no distension, no fluid wave, no abdominal bruit, no ascites, no pulsatile midline mass and no mass. There is no tenderness. There is no guarding.   Musculoskeletal: Normal range of motion.   Neurological: She is alert and oriented to person, place, and time. She has normal strength and normal reflexes. No cranial nerve deficit. GCS eye subscore is 4. GCS verbal subscore is 5. GCS motor  subscore is 6.   Skin: Skin is warm and dry.   Psychiatric: She has a normal mood and affect. Her behavior is normal.   Nursing note and vitals reviewed.      Procedures           ED Course                  MDM      Final diagnoses:   Diverticulitis            Eduardo Castro MD  06/05/19 [id number]

## 2019-06-11 ENCOUNTER — OFFICE VISIT (OUTPATIENT)
Dept: GASTROENTEROLOGY | Facility: CLINIC | Age: 54
End: 2019-06-11

## 2019-06-11 ENCOUNTER — LAB (OUTPATIENT)
Dept: LAB | Facility: HOSPITAL | Age: 54
End: 2019-06-11

## 2019-06-11 VITALS
HEART RATE: 67 BPM | OXYGEN SATURATION: 98 % | WEIGHT: 180 LBS | BODY MASS INDEX: 28.93 KG/M2 | DIASTOLIC BLOOD PRESSURE: 72 MMHG | TEMPERATURE: 97.6 F | HEIGHT: 66 IN | SYSTOLIC BLOOD PRESSURE: 124 MMHG

## 2019-06-11 DIAGNOSIS — K57.92 DIVERTICULITIS: ICD-10-CM

## 2019-06-11 DIAGNOSIS — R10.32 LLQ ABDOMINAL PAIN: ICD-10-CM

## 2019-06-11 DIAGNOSIS — R19.7 DIARRHEA, UNSPECIFIED TYPE: ICD-10-CM

## 2019-06-11 DIAGNOSIS — R93.5 ABNORMAL CT OF THE ABDOMEN: ICD-10-CM

## 2019-06-11 DIAGNOSIS — R68.83 CHILLS: ICD-10-CM

## 2019-06-11 DIAGNOSIS — K57.92 DIVERTICULITIS: Primary | ICD-10-CM

## 2019-06-11 LAB
ADV 40+41 DNA STL QL NAA+NON-PROBE: NOT DETECTED
ALBUMIN SERPL-MCNC: 4.3 G/DL (ref 3.5–5)
ALBUMIN/GLOB SERPL: 1.7 G/DL (ref 1.1–2.5)
ALP SERPL-CCNC: 74 U/L (ref 24–120)
ALT SERPL W P-5'-P-CCNC: 18 U/L (ref 0–54)
ANION GAP SERPL CALCULATED.3IONS-SCNC: 7 MMOL/L (ref 4–13)
AST SERPL-CCNC: 21 U/L (ref 7–45)
ASTRO TYP 1-8 RNA STL QL NAA+NON-PROBE: NOT DETECTED
BASOPHILS # BLD AUTO: 0.03 10*3/MM3 (ref 0–0.2)
BASOPHILS NFR BLD AUTO: 0.6 % (ref 0–2)
BILIRUB SERPL-MCNC: 0.4 MG/DL (ref 0.1–1)
BUN BLD-MCNC: 17 MG/DL (ref 5–21)
BUN/CREAT SERPL: 27 (ref 7–25)
C CAYETANENSIS DNA STL QL NAA+NON-PROBE: NOT DETECTED
C DIFF TOX GENS STL QL NAA+PROBE: DETECTED
CALCIUM SPEC-SCNC: 9.5 MG/DL (ref 8.4–10.4)
CAMPY SP DNA.DIARRHEA STL QL NAA+PROBE: NOT DETECTED
CHLORIDE SERPL-SCNC: 105 MMOL/L (ref 98–110)
CO2 SERPL-SCNC: 29 MMOL/L (ref 24–31)
CREAT BLD-MCNC: 0.63 MG/DL (ref 0.5–1.4)
CRYPTOSP STL CULT: NOT DETECTED
DEPRECATED RDW RBC AUTO: 38.3 FL (ref 40–54)
E COLI DNA SPEC QL NAA+PROBE: NOT DETECTED
E HISTOLYT AG STL-ACNC: NOT DETECTED
EAEC PAA PLAS AGGR+AATA ST NAA+NON-PRB: NOT DETECTED
EC STX1 + STX2 GENES STL NAA+PROBE: NOT DETECTED
EOSINOPHIL # BLD AUTO: 0.05 10*3/MM3 (ref 0–0.7)
EOSINOPHIL NFR BLD AUTO: 1.1 % (ref 0–4)
EPEC EAE GENE STL QL NAA+NON-PROBE: NOT DETECTED
ERYTHROCYTE [DISTWIDTH] IN BLOOD BY AUTOMATED COUNT: 11.9 % (ref 12–15)
ETEC LTA+ST1A+ST1B TOX ST NAA+NON-PROBE: NOT DETECTED
G LAMBLIA DNA SPEC QL NAA+PROBE: NOT DETECTED
GFR SERPL CREATININE-BSD FRML MDRD: 98 ML/MIN/1.73
GLOBULIN UR ELPH-MCNC: 2.6 GM/DL
GLUCOSE BLD-MCNC: 121 MG/DL (ref 70–100)
HCT VFR BLD AUTO: 38.5 % (ref 37–47)
HGB BLD-MCNC: 13.3 G/DL (ref 12–16)
IMM GRANULOCYTES # BLD AUTO: 0.02 10*3/MM3 (ref 0–0.05)
IMM GRANULOCYTES NFR BLD AUTO: 0.4 % (ref 0–5)
LYMPHOCYTES # BLD AUTO: 1.3 10*3/MM3 (ref 0.72–4.86)
LYMPHOCYTES NFR BLD AUTO: 27.4 % (ref 15–45)
MCH RBC QN AUTO: 30.6 PG (ref 28–32)
MCHC RBC AUTO-ENTMCNC: 34.5 G/DL (ref 33–36)
MCV RBC AUTO: 88.5 FL (ref 82–98)
MONOCYTES # BLD AUTO: 0.33 10*3/MM3 (ref 0.19–1.3)
MONOCYTES NFR BLD AUTO: 6.9 % (ref 4–12)
NEUTROPHILS # BLD AUTO: 3.02 10*3/MM3 (ref 1.87–8.4)
NEUTROPHILS NFR BLD AUTO: 63.6 % (ref 39–78)
NOROVIRUS GI+II RNA STL QL NAA+NON-PROBE: NOT DETECTED
NRBC BLD AUTO-RTO: 0 /100 WBC (ref 0–0.2)
P SHIGELLOIDES DNA STL QL NAA+NON-PROBE: NOT DETECTED
PLATELET # BLD AUTO: 214 10*3/MM3 (ref 130–400)
PMV BLD AUTO: 10.3 FL (ref 6–12)
POTASSIUM BLD-SCNC: 3.7 MMOL/L (ref 3.5–5.3)
PROT SERPL-MCNC: 6.9 G/DL (ref 6.3–8.7)
RBC # BLD AUTO: 4.35 10*6/MM3 (ref 4.2–5.4)
RV RNA STL NAA+PROBE: NOT DETECTED
SALMONELLA DNA SPEC QL NAA+PROBE: NOT DETECTED
SAPO I+II+IV+V RNA STL QL NAA+NON-PROBE: NOT DETECTED
SHIGELLA SP+EIEC IPAH STL QL NAA+PROBE: NOT DETECTED
SODIUM BLD-SCNC: 141 MMOL/L (ref 135–145)
V CHOLERAE DNA SPEC QL NAA+PROBE: NOT DETECTED
VIBRIO DNA SPEC NAA+PROBE: NOT DETECTED
WBC NRBC COR # BLD: 4.75 10*3/MM3 (ref 4.8–10.8)
YERSINIA STL CULT: NOT DETECTED

## 2019-06-11 PROCEDURE — 99214 OFFICE O/P EST MOD 30 MIN: CPT | Performed by: NURSE PRACTITIONER

## 2019-06-11 PROCEDURE — 85025 COMPLETE CBC W/AUTO DIFF WBC: CPT | Performed by: NURSE PRACTITIONER

## 2019-06-11 PROCEDURE — 87507 IADNA-DNA/RNA PROBE TQ 12-25: CPT | Performed by: NURSE PRACTITIONER

## 2019-06-11 PROCEDURE — 80053 COMPREHEN METABOLIC PANEL: CPT | Performed by: NURSE PRACTITIONER

## 2019-06-11 PROCEDURE — 87205 SMEAR GRAM STAIN: CPT | Performed by: NURSE PRACTITIONER

## 2019-06-11 PROCEDURE — 36415 COLL VENOUS BLD VENIPUNCTURE: CPT

## 2019-06-11 RX ORDER — SODIUM, POTASSIUM,MAG SULFATES 17.5-3.13G
1 SOLUTION, RECONSTITUTED, ORAL ORAL EVERY 12 HOURS
Qty: 2 BOTTLE | Refills: 0 | Status: SHIPPED | OUTPATIENT
Start: 2019-06-11 | End: 2019-07-26 | Stop reason: HOSPADM

## 2019-06-11 NOTE — PROGRESS NOTES
Chief Complaint:   Chief Complaint   Patient presents with   • Abdominal Pain     Pt was having lower abdomen at the end of May-went to Urgent Care-had CT that showed thickening in the colon wall; Pt was put on Flagyl but couldn't tolerate it-went to ER and was switched to Amoxicillin   • Diarrhea     Pt has been having diarrhea recently; was constipated when she was at     • Nausea     Pt does have occasional nausea-no vomiting for the last 5 days          Patient ID: Lucero Mock is a 53 y.o. female     History of Present Illness: This is a very pleasant 53-year-old female who was referred to our office after abnormal findings on imaging that include wall thickening of the colon and acute diverticulitis.    The patient was seen on 5/31/2019 at [facility] urgent care with complaints of lower abdominal pain, hematuria and findings of acute diverticulitis.  She was treated with Bactrim and Flagyl.  CT scan noted below.  The patient was then seen in the emergency department on 6/5/2019 at [facility] diagnosed with diverticulitis.  She was there with complaints of abdominal pain and having had nausea and vomiting with taking Flagyl.  The patient was changed to Augmentin and given Zofran.  The patient states that she has 3 days left of Augmentin but after a few days she began to have watery diarrhea that has continued up to 5-7 a day.  States she is still having some left lower quadrant abdominal discomfort however that seems to be getting better.  She states her nausea seems to have resolved.    The patient denies any nausea, vomiting, epigastric pain, dysphagia, pyrosis or hematemesis.  The patient denies any fever .  Denies any melena or hematochezia.  Denies any unintentional weight loss or loss of appetite.  The patient's last colonoscopy was performed on 10/28/2016 with findings of diverticulosis only in the sigmoid colon otherwise normal.      CT of abdomen and pelvis 5/31/2019  IMPRESSION:  1.  No CT evidence of urinary tract calculi or obstructive uropathy.  2. Diverticulosis of the sigmoid colon. Focal area of irregular wall  thickening containing diverticuli with mild induration of the  pericolonic fat in the mid to proximal sigmoid colon, left hemipelvis,  mild focal acute diverticulitis suspected. Correlate with patient  presentation. Follow-up imaging recommended to assure a benign process  exclude possible neoplasm.  3. Small amount of free fluid in the pelvis.  This report was finalized on 05/31/2019 14:37 by Dr. Pepe Lawson MD.    Past Medical History:   Diagnosis Date   • Anxiety    • Diabetes mellitus (CMS/HCC)    • Disease of thyroid gland    • Endometriosis    • Esophageal reflux    • Heart disease    • Hypoglycemia    • Panic disorder    • SVT (supraventricular tachycardia) (CMS/HCC)        Past Surgical History:   Procedure Laterality Date   • BREAST BIOPSY Bilateral 2005, 2014    benign   • BREAST EXCISIONAL BIOPSY Left 2014    benign   • CARPAL TUNNEL RELEASE     • COLONOSCOPY N/A 10/28/2016    Diverticulosis in the sigmoid colon; The examination was otherwise normal on direct and retroflexion views; No specimens collected; Repeat 10 years   • DIAGNOSTIC LAPAROSCOPY EXPLORATORY LAPAROTOMY      For endometriosis   • HYSTERECTOMY  2012         Current Outpatient Medications:   •  amoxicillin-clavulanate (AUGMENTIN) 875-125 MG per tablet, Take 1 tablet by mouth Every 12 (Twelve) Hours for 10 days., Disp: 20 tablet, Rfl: 0  •  Biotin 7500 MCG tablet, Take 1 tablet by mouth Daily., Disp: , Rfl:   •  Calcium Carbonate-Vit D-Min (CALTRATE 600+D PLUS PO), Take 600 mg by mouth daily., Disp: , Rfl:   •  levothyroxine (SYNTHROID, LEVOTHROID) 50 MCG tablet, Take 50 mcg by mouth daily., Disp: , Rfl:   •  loratadine (CLARITIN) 10 MG tablet, Take 10 mg by mouth daily., Disp: , Rfl:   •  metoprolol tartrate (LOPRESSOR) 25 MG tablet, Take 1 tablet by mouth 2 (Two) Times a Day., Disp: 60 tablet, Rfl:  "11  •  mometasone (NASONEX) 50 MCG/ACT nasal spray, 2 sprays into each nostril daily., Disp: , Rfl:   •  ondansetron (ZOFRAN) 4 MG tablet, Take 1 tablet by mouth Every 6 (Six) Hours., Disp: 15 tablet, Rfl: 0  •  ONE TOUCH ULTRA TEST test strip, USE ONCE DAILY AS NEEDED, Disp: , Rfl: 11  •  pantoprazole (PROTONIX) 40 MG EC tablet, TAKE ONE TABLET BY MOUTH DAILY FOR ACID REFLUX, Disp: , Rfl: 2  •  Probiotic Product (PROBIOTIC-10 PO), Take 1 tablet by mouth Daily., Disp: , Rfl:   •  sertraline (ZOLOFT) 50 MG tablet, Take 50 mg by mouth daily., Disp: , Rfl:   •  sodium-potassium-magnesium sulfates (SUPREP BOWEL PREP KIT) 17.5-3.13-1.6 GM/177ML solution oral solution, Take 1 bottle by mouth Every 12 (Twelve) Hours. Split dose prep as directed by office instructions provided.  2 bottles = one kit., Disp: 2 bottle, Rfl: 0    Allergies   Allergen Reactions   • Flagyl [Metronidazole] GI Intolerance       Social History     Socioeconomic History   • Marital status:      Spouse name: Not on file   • Number of children: Not on file   • Years of education: Not on file   • Highest education level: Not on file   Tobacco Use   • Smoking status: Never Smoker   • Smokeless tobacco: Never Used   Substance and Sexual Activity   • Alcohol use: Yes     Comment: Rarely   • Drug use: No       Family History   Problem Relation Age of Onset   • Crohn's disease Mother    • Irritable bowel syndrome Mother    • Cancer Father    • Breast cancer Maternal Aunt    • Celiac disease Neg Hx    • Cirrhosis Neg Hx    • Colon cancer Neg Hx    • Esophageal cancer Neg Hx    • Liver cancer Neg Hx    • Liver disease Neg Hx    • Rectal cancer Neg Hx    • Stomach cancer Neg Hx    • Colon polyps Neg Hx        Vitals:    06/11/19 0937   BP: 124/72   BP Location: Left arm   Patient Position: Sitting   Cuff Size: Adult   Pulse: 67   Temp: 97.6 °F (36.4 °C)   TempSrc: Tympanic   SpO2: 98%   Weight: 81.6 kg (180 lb)   Height: 167.6 cm (66\")       Review of " Systems:    General:    Present -feeling well   Skin:    Not Present-Rash   HEENT:     Not Present-Acute visual changes or Acute hearing changes   Neck :    Not Present- swollen glands   Genitourinary:      Not Present- burning, frequency, urgency hematuria, dysuria,   Cardiovascular:   Not Present-chest pain, palpitations, or pressure   Respiratory:   Not Present- shortness of breath or cough   Gastrointestinal:  Musculoskeletal:  Neurological:  Psychiatric:   Present as mentioned in the HP    Not Present. Recent gait disturbances.    Not Present-Seizures and weakness in extremities.    Not Present- Anxiety or Depression.       Physical Exam:    General Appearance:    Alert, cooperative, in no acute distress   Psych:    Mood appropriate    Eyes:          conjunctivae and sclerae normal, no   icterus, no pallor   ENMT:    Ears appear intact with no abnormalities noted oral mucosa moist   Neck:   No adenopathy, supple, trachea midline, no thyromegaly, no   carotid bruit, no JVD    Cardiovascular:    Regular rhythm and normal rate, normal S1 and S2, no            murmur, no gallop, no rub, no click   Gastrointestinal:     Inspection normal.  Normal bowel sounds, no masses, no organomegaly, soft round non-tender, non-distended, no guarding, no rebound or tenderness. No hepatosplenomegaly.   Skin:   No bleeding, bruising or rash   Neurologic:   nonfocal       No results found for: WBC, HGB, HCT, PLT     Lab Results   Component Value Date    CREATININE 1.00 03/19/2019    CREATININE 0.70 04/16/2018       No results found for: INR    Body mass index is 29.05 kg/m². Patient's Body mass index is 29.05 kg/m². BMI is above normal parameters. Recommendations include: nutrition counseling.    Assessment and Plan:  Assessment/Plan   Lucero was seen today for abdominal pain, diarrhea and nausea.    Diagnoses and all orders for this visit:    Diverticulitis  Comments:  this is her second episode of diverticulitis within the past  13 months. Will await stools and schedule colonoscopy out at 6 weeks  Orders:  -     CBC & Differential; Future  -     Comprehensive Metabolic Panel; Future  -     Case Request; Standing  -     Case Request    LLQ abdominal pain  -     CBC & Differential; Future  -     Comprehensive Metabolic Panel; Future  -     Case Request; Standing  -     Case Request    Diarrhea, unspecified type  Comments:  Gastrointestinal panel for pathogens.  Will draw labs as they were not checked in the ER.  Will notify patient of results but again will wait for colonoscopy at  Orders:  -     CBC & Differential; Future  -     Comprehensive Metabolic Panel; Future  -     Gastrointestinal Panel, PCR - Stool, Per Rectum; Future  -     Fecal Leukocytes - Stool, Per Rectum; Future  -     Case Request; Standing  -     Case Request    Chills  Comments:  intermittently   Orders:  -     CBC & Differential; Future  -     Comprehensive Metabolic Panel; Future  -     Case Request; Standing  -     Case Request    Abnormal CT of the abdomen  -     Case Request; Standing  -     Case Request    Other orders  -     Follow Anesthesia Guidelines / Standing Orders; Future  -     Obtain Informed Consent; Future  -     Implement Anesthesia Orders Day of Procedure; Standing  -     Obtain Informed Consent; Standing  -     Verify bowel prep was successful; Standing  -     sodium-potassium-magnesium sulfates (SUPREP BOWEL PREP KIT) 17.5-3.13-1.6 GM/177ML solution oral solution; Take 1 bottle by mouth Every 12 (Twelve) Hours. Split dose prep as directed by office instructions provided.  2 bottles = one kit.             There are no Patient Instructions on file for this visit.    Next follow-up appointment      The risks, benefits, and alternatives of endoscopy were reviewed with the patient today.  Risks including perforation, with or without dilation, possibly requiring surgery.  Additional risks include risk of bleeding.  There is also the risk of a drug reaction  or problems with anesthesia.  This will be discussed with the further by the anesthesia team on the day of the procedure. The benefits include the diagnosis and management of disease of the upper digestive tract.  Alternatives to endoscopy include upper GI series, laboratory testing, radiographic evaluation, or no intervention.  The patient verbalizes understanding and agrees.    The risks, benefits, and alternatives of colonoscopy were reviewed with the patient today.  Risks including perforation of the colon possibly requiring surgery or colostomy.  Additional risks include risk of bleeding from biopsies or removal of colon tissue.  There is also the risk of a drug reaction or problems with anesthesia.  This will be discussed with the further by the anesthesia team on the day of the procedure.  Lastly there is a possibility of missing a colon polyp or cancer.  The benefits include the diagnosis and management of disease of the colon and rectum.  Alternatives to colonoscopy include barium enema, laboratory testing, radiographic evaluation, or no intervention.  The patient verbalizes understanding and agrees.      EMR Dragon/Transcription disclaimer:  Much of this encounter note is an electronic transcription/translation of spoken language to printed text. The electronic translation of spoken language may permit erroneous, or at times, nonsensical words or phrases to be inadvertently transcribed; although I have reviewed the note for such errors, some may still exist.

## 2019-06-12 ENCOUNTER — TELEPHONE (OUTPATIENT)
Dept: GASTROENTEROLOGY | Facility: CLINIC | Age: 54
End: 2019-06-12

## 2019-06-12 LAB — WBC STL QL MICRO: ABNORMAL

## 2019-06-12 RX ORDER — VANCOMYCIN HYDROCHLORIDE 250 MG/1
250 CAPSULE ORAL 4 TIMES DAILY
Qty: 40 CAPSULE | Refills: 0 | Status: SHIPPED | OUTPATIENT
Start: 2019-06-12 | End: 2019-06-22

## 2019-06-12 NOTE — TELEPHONE ENCOUNTER
I called [facility]-got the phone number to call to PA vancomycin for pt. I called [phone] and initiated the PA. It will take 24-72 business hours for them to fax us a determination. I tried to call pt to discuss-was unable to reach her so I left  asking her to call me back. I called [facility] and spoke to Madison-updated her on the situation in case pt calls them for an update.

## 2019-06-12 NOTE — TELEPHONE ENCOUNTER
----- Message from ABBY Chinchilla sent at 6/12/2019  8:22 AM CDT -----  Please notify the patient that stools were positive for C. difficile I will send in vancomycin to her pharmacy.  We will need to push her colonoscopy out and she needs to let us know how she is doing after the treatment of vancomycin

## 2019-06-12 NOTE — TELEPHONE ENCOUNTER
Pt called me back and I spoke to her about abx situation. She was asking if she needed to continue her Amoxicillin for diverticulitis. I spoke to Dr. Kee about-she says that more than likely the c.diff came from the Amoxicillin/recent abx use. Dr. Kee wants pt to finish her Amoxicillin and then start on the vanco-pt VU. That should give us time to get the vanco approved. Advised pt I would let her know as soon as I heard from the insurance about the PA.

## 2019-06-13 NOTE — TELEPHONE ENCOUNTER
Rec'd fax from [employer] today-vancomycin was approved. I called pt to let her know-she CLEMENCIA. I also called [facility]-left VM on prescriber line letting them know it was approved.

## 2019-06-17 ENCOUNTER — TELEPHONE (OUTPATIENT)
Dept: GASTROENTEROLOGY | Facility: CLINIC | Age: 54
End: 2019-06-17

## 2019-06-17 NOTE — TELEPHONE ENCOUNTER
Pt just called me-she started on the Vanco for c.diff this past Friday. She states she still isn't feeling well. Her diarrhea is better, but she has been feeling very bloated all weekend. She also states she is still having lower abdominal pain/cramping, a little nausea, and she started running a low-grade fever Friday night. She has had a low-grade fever every night since starting the Vanco.     She does report her diarrhea is better, but she was asking what she could do? She missed work today and is asking for a work excuse for today and maybe a couple more days since she is feeling so bad. I told her I would have to let you know what was going on and ask about the excuse, and I would call her back this afternoon. Thanks.

## 2019-06-17 NOTE — TELEPHONE ENCOUNTER
Please notify the patient that she does need to continue taking the vancomycin as this is the expected treatment for C. difficile.  How high is her fever?  I am not that concerned with a low-grade fever however should she continue to feel bad her symptoms change or worsen I can check some labs.  Previous labs CBC was normal white blood cells showed no signs of infection.  I do not feel I should repeat a CT scan as that exposes her to more radiation.  I do not mind giving her doctor's excuse.

## 2019-06-17 NOTE — TELEPHONE ENCOUNTER
Spoke to pt re: Katherin's recommendations-she VU. She will finish the Vanco as prescribed. Also advised her that should she start feeling worse-we recommend going to Urgent Care or ER. I left work excuse up front for pt-she will come by today to pick it up.

## 2019-06-19 ENCOUNTER — TELEPHONE (OUTPATIENT)
Dept: GASTROENTEROLOGY | Facility: CLINIC | Age: 54
End: 2019-06-19

## 2019-06-19 NOTE — TELEPHONE ENCOUNTER
"Pt left me a VM while I was on lunch. She states she still isn't feeling any better-is still feeling very bloated, running a low-grade fever, and just doesn't feel like \"moving up and down or around anywhere.\" Katherin gave her a work excuse to last through tomorrow, but she was asking if we could extend that through Monday?     I wanted to check with you before doing that to make sure you were ok with it. Also-I wanted to see if you had any further recommendations for her? She has only been on the Vanco for 6 days-since 6/13. I will call her back later today.   "

## 2019-06-19 NOTE — TELEPHONE ENCOUNTER
OK to extend work excuse as requested.  If her complaints persist or worsen, then would need to go to the ER.    Marisol Kee MD

## 2019-07-26 ENCOUNTER — ANESTHESIA (OUTPATIENT)
Dept: GASTROENTEROLOGY | Facility: HOSPITAL | Age: 54
End: 2019-07-26

## 2019-07-26 ENCOUNTER — ANESTHESIA EVENT (OUTPATIENT)
Dept: GASTROENTEROLOGY | Facility: HOSPITAL | Age: 54
End: 2019-07-26

## 2019-07-26 ENCOUNTER — HOSPITAL ENCOUNTER (OUTPATIENT)
Facility: HOSPITAL | Age: 54
Setting detail: HOSPITAL OUTPATIENT SURGERY
Discharge: HOME | End: 2019-07-26
Attending: INTERNAL MEDICINE | Admitting: INTERNAL MEDICINE

## 2019-07-26 VITALS
TEMPERATURE: 97.2 F | BODY MASS INDEX: 28.56 KG/M2 | OXYGEN SATURATION: 100 % | RESPIRATION RATE: 12 BRPM | DIASTOLIC BLOOD PRESSURE: 64 MMHG | HEIGHT: 67 IN | SYSTOLIC BLOOD PRESSURE: 111 MMHG | HEART RATE: 50 BPM | WEIGHT: 182 LBS

## 2019-07-26 PROBLEM — R10.32 LLQ ABDOMINAL PAIN: Status: RESOLVED | Noted: 2019-06-11 | Resolved: 2019-07-26

## 2019-07-26 PROBLEM — R68.83 CHILLS: Status: RESOLVED | Noted: 2019-06-11 | Resolved: 2019-07-26

## 2019-07-26 PROBLEM — R19.7 DIARRHEA: Status: RESOLVED | Noted: 2019-06-11 | Resolved: 2019-07-26

## 2019-07-26 PROBLEM — K57.92 DIVERTICULITIS: Status: RESOLVED | Noted: 2019-06-11 | Resolved: 2019-07-26

## 2019-07-26 PROCEDURE — 45378 DIAGNOSTIC COLONOSCOPY: CPT | Performed by: INTERNAL MEDICINE

## 2019-07-26 PROCEDURE — 25010000002 PROPOFOL 10 MG/ML EMULSION: Performed by: NURSE ANESTHETIST, CERTIFIED REGISTERED

## 2019-07-26 RX ORDER — PROPOFOL 10 MG/ML
VIAL (ML) INTRAVENOUS AS NEEDED
Status: DISCONTINUED | OUTPATIENT
Start: 2019-07-26 | End: 2019-07-26 | Stop reason: SURG

## 2019-07-26 RX ORDER — SODIUM CHLORIDE 9 MG/ML
500 INJECTION, SOLUTION INTRAVENOUS CONTINUOUS PRN
Status: DISCONTINUED | OUTPATIENT
Start: 2019-07-26 | End: 2019-07-26 | Stop reason: HOSPADM

## 2019-07-26 RX ORDER — SODIUM CHLORIDE 0.9 % (FLUSH) 0.9 %
3 SYRINGE (ML) INJECTION AS NEEDED
Status: DISCONTINUED | OUTPATIENT
Start: 2019-07-26 | End: 2019-07-26 | Stop reason: HOSPADM

## 2019-07-26 RX ADMIN — SODIUM CHLORIDE 500 ML: 9 INJECTION, SOLUTION INTRAVENOUS at 13:29

## 2019-07-26 RX ADMIN — PROPOFOL 250 MG: 10 INJECTION, EMULSION INTRAVENOUS at 14:02

## 2019-07-26 NOTE — ANESTHESIA PREPROCEDURE EVALUATION
Anesthesia Evaluation     Patient summary reviewed and Nursing notes reviewed   NPO Solid Status: > 8 hours  NPO Liquid Status: > 8 hours           Airway   Mallampati: II  TM distance: >3 FB  No difficulty expected  Dental - normal exam     Pulmonary - negative pulmonary ROS and normal exam   Cardiovascular   Exercise tolerance: good (4-7 METS)    Patient on routine beta blocker and Beta blocker given within 24 hours of surgery  Rhythm: regular  Rate: normal    (+) dysrhythmias Tachycardia,       Neuro/Psych  (+) psychiatric history Depression and Anxiety,     GI/Hepatic/Renal/Endo    (+)  GERD,  hypothyroidism,     Musculoskeletal     Abdominal  - normal exam   Substance History      OB/GYN          Other                        Anesthesia Plan    ASA 2     MAC     intravenous induction   Anesthetic plan, all risks, benefits, and alternatives have been provided, discussed and informed consent has been obtained with: patient.

## 2019-07-26 NOTE — ANESTHESIA POSTPROCEDURE EVALUATION
"Patient: Lucero Mock    Procedure Summary     Date:  07/26/19 Room / Location:  [facility] ENDOSCOPY 2 / [facility]    Anesthesia Start:  1359 Anesthesia Stop:  1422    Procedure:  COLONOSCOPY WITH ANESTHESIA (N/A ) Diagnosis:       Diverticulitis      LLQ abdominal pain      Diarrhea, unspecified type      Chills      Abnormal CT of the abdomen      (Diverticulitis [K57.92])      (LLQ abdominal pain [R10.32])      (Diarrhea, unspecified type [R19.7])      (Chills [R68.83])      (Abnormal CT of the abdomen [R93.5])    Surgeon:  Marisol Kee MD Provider:  Ken Pelaez CRNA    Anesthesia Type:  MAC ASA Status:  2          Anesthesia Type: MAC  Last vitals  BP   130/78 (07/26/19 1306)   Temp   97.2 °F (36.2 °C) (07/26/19 1306)   Pulse   57 (07/26/19 1306)   Resp   18 (07/26/19 1306)     SpO2   98 % (07/26/19 1306)     Post Anesthesia Care and Evaluation    Patient location during evaluation: PACU  Patient participation: complete - patient participated  Level of consciousness: awake and alert  Pain management: adequate  Airway patency: patent  Anesthetic complications: No anesthetic complications    Cardiovascular status: acceptable  Respiratory status: acceptable  Hydration status: acceptable    Comments: Blood pressure 130/78, pulse 57, temperature 97.2 °F (36.2 °C), temperature source Temporal, resp. rate 18, height 168.9 cm (66.5\"), weight 82.6 kg (182 lb), SpO2 98 %, not currently breastfeeding.    Pt discharged from PACU based on soto score >8      "

## 2019-11-06 ENCOUNTER — OFFICE VISIT (OUTPATIENT)
Dept: CARDIOLOGY | Facility: CLINIC | Age: 54
End: 2019-11-06

## 2019-11-06 VITALS
SYSTOLIC BLOOD PRESSURE: 101 MMHG | DIASTOLIC BLOOD PRESSURE: 77 MMHG | WEIGHT: 199 LBS | BODY MASS INDEX: 31.23 KG/M2 | HEIGHT: 67 IN | HEART RATE: 56 BPM

## 2019-11-06 DIAGNOSIS — I47.1 SVT (SUPRAVENTRICULAR TACHYCARDIA) (HCC): ICD-10-CM

## 2019-11-06 DIAGNOSIS — I47.1 PAROXYSMAL SVT (SUPRAVENTRICULAR TACHYCARDIA) (HCC): Primary | ICD-10-CM

## 2019-11-06 PROCEDURE — 99213 OFFICE O/P EST LOW 20 MIN: CPT | Performed by: INTERNAL MEDICINE

## 2019-11-06 PROCEDURE — 93000 ELECTROCARDIOGRAM COMPLETE: CPT | Performed by: INTERNAL MEDICINE

## 2019-11-06 RX ORDER — MECLIZINE HCL 12.5 MG/1
12.5 TABLET ORAL 3 TIMES DAILY PRN
COMMUNITY
End: 2022-08-30

## 2019-11-06 RX ORDER — FLUTICASONE PROPIONATE 50 MCG
2 SPRAY, SUSPENSION (ML) NASAL DAILY
COMMUNITY

## 2019-11-06 RX ORDER — CETIRIZINE HYDROCHLORIDE 10 MG/1
10 TABLET ORAL DAILY
COMMUNITY

## 2019-11-06 RX ORDER — DICYCLOMINE HYDROCHLORIDE 10 MG/1
10 CAPSULE ORAL
COMMUNITY
End: 2022-08-30

## 2019-11-06 RX ORDER — NAPROXEN 500 MG/1
500 TABLET ORAL AS NEEDED
COMMUNITY

## 2019-11-06 RX ORDER — ALPRAZOLAM 0.5 MG/1
0.5 TABLET ORAL 2 TIMES DAILY PRN
COMMUNITY

## 2019-11-06 NOTE — PROGRESS NOTES
Konrad Mock is a 54 y.o. female. Fu of rhythm    History of Present Illness     PAR SVT:  Has had only very short palpitations on her current meds. About once a month she will take an extra Lopressor dose for better control. Did not see EP because of network. Is active. Is compliant with meds that are well tolerated. 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 She feels fatigued with Lopressor and would like to get off it. Will refer to EP - Dr Avilez.      The following portions of the patient's history were reviewed and updated as appropriate: allergies, current medications, past family history, past medical history, past social history, past surgical history and problem list.    Patient Active Problem List   Diagnosis   • Colon cancer screening   • SVT (supraventricular tachycardia) (CMS/HCC)   • Abnormal CT of the abdomen       Allergies   Allergen Reactions   • Flagyl [Metronidazole] GI Intolerance       Family History   Problem Relation Age of Onset   • Crohn's disease Mother    • Irritable bowel syndrome Mother    • Cancer Father    • Breast cancer Maternal Aunt    • Celiac disease Neg Hx    • Cirrhosis Neg Hx    • Colon cancer Neg Hx    • Esophageal cancer Neg Hx    • Liver cancer Neg Hx    • Liver disease Neg Hx    • Rectal cancer Neg Hx    • Stomach cancer Neg Hx    • Colon polyps Neg Hx        Social History     Socioeconomic History   • Marital status:      Spouse name: Not on file   • Number of children: Not on file   • Years of education: Not on file   • Highest education level: Not on file   Tobacco Use   • Smoking status: Never Smoker   • Smokeless tobacco: Never Used   Substance and Sexual Activity   • Alcohol use: Yes     Comment: Rarely   • Drug use: No   • Sexual activity: Defer         Current Outpatient Medications:   •  ALPRAZolam (XANAX) 0.5 MG tablet, Take 0.5 mg by mouth 2 (Two) Times a Day As Needed for Anxiety., Disp: , Rfl:   •  Biotin  7500 MCG tablet, Take 1 tablet by mouth Daily., Disp: , Rfl:   •  Calcium Carbonate-Vit D-Min (CALTRATE 600+D PLUS PO), Take 600 mg by mouth daily., Disp: , Rfl:   •  cetirizine (zyrTEC) 10 MG tablet, Take 10 mg by mouth Daily., Disp: , Rfl:   •  dicyclomine (BENTYL) 10 MG capsule, Take 10 mg by mouth 4 (Four) Times a Day Before Meals & at Bedtime., Disp: , Rfl:   •  fluticasone (FLONASE) 50 MCG/ACT nasal spray, 2 sprays into the nostril(s) as directed by provider Daily., Disp: , Rfl:   •  levothyroxine (SYNTHROID, LEVOTHROID) 50 MCG tablet, Take 50 mcg by mouth daily., Disp: , Rfl:   •  meclizine (ANTIVERT) 12.5 MG tablet, Take 12.5 mg by mouth 3 (Three) Times a Day As Needed for Dizziness., Disp: , Rfl:   •  metoprolol tartrate (LOPRESSOR) 25 MG tablet, Take 1 tablet by mouth 2 (Two) Times a Day., Disp: 60 tablet, Rfl: 11  •  metroNIDAZOLE (METROCREAM) 0.75 % cream, Apply  topically to the appropriate area as directed 2 (Two) Times a Day., Disp: , Rfl:   •  naproxen (NAPROSYN) 500 MG tablet, Take 500 mg by mouth 2 (Two) Times a Day With Meals., Disp: , Rfl:   •  ondansetron (ZOFRAN) 4 MG tablet, Take 1 tablet by mouth Every 6 (Six) Hours., Disp: 15 tablet, Rfl: 0  •  ONE TOUCH ULTRA TEST test strip, USE ONCE DAILY AS NEEDED, Disp: , Rfl: 11  •  pantoprazole (PROTONIX) 40 MG EC tablet, TAKE ONE TABLET BY MOUTH DAILY FOR ACID REFLUX, Disp: , Rfl: 2  •  Probiotic Product (PROBIOTIC-10 PO), Take 1 tablet by mouth Daily., Disp: , Rfl:   •  sertraline (ZOLOFT) 50 MG tablet, Take 50 mg by mouth daily., Disp: , Rfl:   •  loratadine (CLARITIN) 10 MG tablet, Take 10 mg by mouth daily., Disp: , Rfl:   •  mometasone (NASONEX) 50 MCG/ACT nasal spray, 2 sprays into each nostril daily., Disp: , Rfl:     Past Surgical History:   Procedure Laterality Date   • BREAST BIOPSY Bilateral 2005, 2014    benign   • BREAST EXCISIONAL BIOPSY Left 2014    benign   • COLONOSCOPY N/A 10/28/2016    Diverticulosis in the sigmoid colon; The  "examination was otherwise normal on direct and retroflexion views; No specimens collected; Repeat 10 years   • COLONOSCOPY N/A 7/26/2019    Procedure: COLONOSCOPY WITH ANESTHESIA;  Surgeon: Marisol Kee MD;  Location: [facility] ENDOSCOPY;  Service: Gastroenterology   • DIAGNOSTIC LAPAROSCOPY EXPLORATORY LAPAROTOMY      For endometriosis   • HYSTERECTOMY  2012       Review of Systems   Constitutional: Positive for fatigue. Negative for fever and unexpected weight change.   Respiratory: Negative for apnea, chest tightness and shortness of breath.    Cardiovascular: Negative for chest pain, palpitations and leg swelling.   Gastrointestinal: Negative for abdominal pain.   Genitourinary: Negative for dysuria.   Musculoskeletal: Positive for arthralgias and back pain. Negative for myalgias.   Neurological: Negative for weakness and light-headedness.   Psychiatric/Behavioral: Positive for sleep disturbance.       /77   Pulse 56   Ht 168.9 cm (66.5\")   Wt 90.3 kg (199 lb)   BMI 31.64 kg/m²   Procedures    Objective   Physical Exam   Constitutional: She is oriented to person, place, and time. No distress.   Mod obese   HENT:   Head: Normocephalic.   Eyes: Pupils are equal, round, and reactive to light.   Cardiovascular: Normal rate, regular rhythm, normal heart sounds and intact distal pulses. Exam reveals no gallop and no friction rub.   No murmur heard.  Pulmonary/Chest: Effort normal and breath sounds normal. No stridor. No respiratory distress. She has no wheezes. She has no rales.   Abdominal: Soft. Bowel sounds are normal. She exhibits no distension and no mass. There is no tenderness. There is no guarding.   Musculoskeletal: She exhibits no edema, tenderness or deformity.   Neurological: She is alert and oriented to person, place, and time.   Skin: Skin is warm and dry.   Psychiatric: She has a normal mood and affect.       Assessment/Plan   Lucero was seen today for rapid heart rate.    Diagnoses and all " orders for this visit:    Paroxysmal SVT (supraventricular tachycardia) (CMS/HCC)  Comments:  appears as AVNRT - refer to EP  Orders:  -     ECG 12 Lead  -     Ambulatory Referral to Cardiac Electrophysiology  -     metoprolol tartrate (LOPRESSOR) 25 MG tablet; Take 1 tablet by mouth 2 (Two) Times a Day.    SVT (supraventricular tachycardia) (CMS/HCC)                 Return in about 1 year (around 11/6/2020) for Next scheduled follow up with apc.  Orders Placed This Encounter   Procedures   • Ambulatory Referral to Cardiac Electrophysiology     Referral Priority:   Routine     Referral Type:   Consultation     Referral Reason:   Specialty Services Required     Referred to Provider:   Eron Avilez MD     Requested Specialty:   Cardiac Electrophysiology     Number of Visits Requested:   1   • ECG 12 Lead     Order Specific Question:   Reason for Exam:     Answer:   svt

## 2020-03-03 ENCOUNTER — TRANSCRIBE ORDERS (OUTPATIENT)
Dept: ADMINISTRATIVE | Facility: HOSPITAL | Age: 55
End: 2020-03-03

## 2020-03-03 DIAGNOSIS — Z12.31 OTHER SCREENING MAMMOGRAM: Primary | ICD-10-CM

## 2020-04-30 ENCOUNTER — APPOINTMENT (OUTPATIENT)
Dept: MAMMOGRAPHY | Facility: HOSPITAL | Age: 55
End: 2020-04-30

## 2020-05-06 ENCOUNTER — APPOINTMENT (OUTPATIENT)
Dept: MAMMOGRAPHY | Facility: HOSPITAL | Age: 55
End: 2020-05-06

## 2020-07-01 ENCOUNTER — HOSPITAL ENCOUNTER (OUTPATIENT)
Dept: MAMMOGRAPHY | Facility: HOSPITAL | Age: 55
Discharge: HOME | End: 2020-07-01
Admitting: PHYSICIAN ASSISTANT

## 2020-07-01 DIAGNOSIS — Z12.31 OTHER SCREENING MAMMOGRAM: ICD-10-CM

## 2020-07-01 PROCEDURE — 77063 BREAST TOMOSYNTHESIS BI: CPT

## 2020-07-01 PROCEDURE — 77067 SCR MAMMO BI INCL CAD: CPT

## 2020-07-15 ENCOUNTER — OFFICE VISIT (OUTPATIENT)
Dept: SURGERY | Age: 55
End: 2020-07-15
Payer: MEDICAID

## 2020-07-15 VITALS — HEIGHT: 66 IN | BODY MASS INDEX: 32.62 KG/M2 | WEIGHT: 203 LBS | TEMPERATURE: 98.2 F

## 2020-07-15 PROCEDURE — 3017F COLORECTAL CA SCREEN DOC REV: CPT | Performed by: PHYSICIAN ASSISTANT

## 2020-07-15 PROCEDURE — 99213 OFFICE O/P EST LOW 20 MIN: CPT | Performed by: PHYSICIAN ASSISTANT

## 2020-07-15 PROCEDURE — G8419 CALC BMI OUT NRM PARAM NOF/U: HCPCS | Performed by: PHYSICIAN ASSISTANT

## 2020-07-15 PROCEDURE — G8427 DOCREV CUR MEDS BY ELIG CLIN: HCPCS | Performed by: PHYSICIAN ASSISTANT

## 2020-07-15 PROCEDURE — 4004F PT TOBACCO SCREEN RCVD TLK: CPT | Performed by: PHYSICIAN ASSISTANT

## 2020-07-15 RX ORDER — NAPROXEN 500 MG/1
500 TABLET ORAL 2 TIMES DAILY WITH MEALS
COMMUNITY

## 2020-07-15 RX ORDER — DICYCLOMINE HCL 20 MG
TABLET ORAL
COMMUNITY
Start: 2020-05-22

## 2020-07-15 RX ORDER — ALOGLIPTIN 25 MG/1
TABLET, FILM COATED ORAL
COMMUNITY
Start: 2020-06-15

## 2020-07-24 NOTE — PROGRESS NOTES
HPI:  Aidan Bey is in for yearly follow-up breast check. She has a history of LCIS completely excised in 2014. She has not noticed any changes in her breasts. No mammographic evidence of malignancy.  Recommend routine annual  screening mammography. A result letter will be sent to the patient. BI-RADS CATEGORY 1: Negative. Management Recommendation: Routine screening mammography. This report was finalized on 07/01/2020 10:17 by Dr Emelyn Salinas MD.   Result Narrative   EXAM: [address] TOMOSYNTHESIS BILATERAL W CAD- - 7/1/2020  9:56 AM CDT    HISTORY: screening; Z12.31-Encounter for screening mammogram for  malignant neoplasm of breast      COMPARISON: 04/29/2019, 04/26/2018, 04/25/2017, 04/21/2016, 04/20/2015,  12/03/2014 (left only), 04/18/2014. FAMILY HISTORY OF BREAST CANCER: Maternal aunt    TECHNIQUE: Standard digital mammogram images were obtained. Computer  Aided Detection was utilized. In addition, 15 low dose images were  obtained in each projection. These low-dose images were reconstructed  into 1 mm thick slices and reviewed on the soft copy workstation. BREAST COMPOSITION: Category B -- There are scattered areas of  fibroglandular density. FINDINGS: There are no suspicious findings. BREAST EXAM:  On examination, she has fibrocystic changes throughout both breasts, no dominant masses, no skin or nipple changes and no axillary adenopathy. I see nothing suspicious for breast cancer. ASSESSMENT:  Benign fibrocystic changes              PLAN:  I will plan to see her back in 1 year for physical exam and bilateral mammograms. She will contact me if anything significant changes. 15 minutes spent, which includes face to face with patient, record review, evaluation, planning, and education. I spent over 50% of this visit counseling patient.

## 2021-02-04 ENCOUNTER — TRANSCRIBE ORDERS (OUTPATIENT)
Dept: ADMINISTRATIVE | Facility: HOSPITAL | Age: 56
End: 2021-02-04

## 2021-02-04 DIAGNOSIS — Z12.31 OTHER SCREENING MAMMOGRAM: Primary | ICD-10-CM

## 2021-07-13 ENCOUNTER — OFFICE VISIT (OUTPATIENT)
Dept: SURGERY | Age: 56
End: 2021-07-13
Payer: MEDICAID

## 2021-07-13 ENCOUNTER — HOSPITAL ENCOUNTER (OUTPATIENT)
Dept: MAMMOGRAPHY | Facility: HOSPITAL | Age: 56
Discharge: HOME | End: 2021-07-13
Admitting: PHYSICIAN ASSISTANT

## 2021-07-13 VITALS
TEMPERATURE: 97.9 F | WEIGHT: 190 LBS | HEIGHT: 66 IN | DIASTOLIC BLOOD PRESSURE: 80 MMHG | HEART RATE: 64 BPM | BODY MASS INDEX: 30.53 KG/M2 | SYSTOLIC BLOOD PRESSURE: 112 MMHG

## 2021-07-13 DIAGNOSIS — Z12.31 VISIT FOR SCREENING MAMMOGRAM: Primary | ICD-10-CM

## 2021-07-13 DIAGNOSIS — Z12.31 VISIT FOR SCREENING MAMMOGRAM: ICD-10-CM

## 2021-07-13 DIAGNOSIS — Z12.31 OTHER SCREENING MAMMOGRAM: ICD-10-CM

## 2021-07-13 PROCEDURE — G8427 DOCREV CUR MEDS BY ELIG CLIN: HCPCS | Performed by: PHYSICIAN ASSISTANT

## 2021-07-13 PROCEDURE — 77063 BREAST TOMOSYNTHESIS BI: CPT

## 2021-07-13 PROCEDURE — 3017F COLORECTAL CA SCREEN DOC REV: CPT | Performed by: PHYSICIAN ASSISTANT

## 2021-07-13 PROCEDURE — G8417 CALC BMI ABV UP PARAM F/U: HCPCS | Performed by: PHYSICIAN ASSISTANT

## 2021-07-13 PROCEDURE — 99213 OFFICE O/P EST LOW 20 MIN: CPT | Performed by: PHYSICIAN ASSISTANT

## 2021-07-13 PROCEDURE — 4004F PT TOBACCO SCREEN RCVD TLK: CPT | Performed by: PHYSICIAN ASSISTANT

## 2021-07-13 PROCEDURE — 77067 SCR MAMMO BI INCL CAD: CPT

## 2021-07-13 RX ORDER — CETIRIZINE HYDROCHLORIDE 10 MG/1
10 TABLET ORAL DAILY
COMMUNITY

## 2021-07-13 RX ORDER — FLUTICASONE PROPIONATE 50 MCG
SPRAY, SUSPENSION (ML) NASAL
COMMUNITY
Start: 2021-05-26

## 2021-07-13 NOTE — PROGRESS NOTES
HPI:  Carla Simms is in for yearly follow-up breast check. She has not noticed any changes in her breasts. Her imaging was reviewed and is noted below. Examination: Bilateral Digital Screening Mammogram with Computer Aided   Detection,   Bilateral Digital Breast Tomosynthesis     Date of Exam:  7/13/2021 1:47 PM CDT     Indication: Screen     Comparison: Mammogram dated 07/01/2020 04/29/2019, 04/26/2018. Self-reported family history of breast cancer: Maternal aunt with   history of breast cancer. Technique: 2D  and 3 D bilateral digital mammogram images were obtained. Findings:   Breast Density Category (QDC) = B     There are scattered areas of fibroglandular density. No suspicious   findings are present. Impression:   1.  No mammographic evidence of malignancy. 2.  BI-RADS Final Assessment Category 1: Negative. 3.  Recommend annual screening mammography. BREAST EXAM:  On examination, she has fibrocystic changes throughout both breasts, no dominant masses, no skin or nipple changes and no axillary adenopathy. I see nothing suspicious for breast cancer. ASSESSMENT:  Benign fibrocystic changes              PLAN:  I will plan to see her back in 1 year for physical exam and yearly mammograms. She will contact me if anything significant changes. 20 minutes spent, which includes face to face with patient, record review, evaluation, planning, and education.

## 2022-06-07 ENCOUNTER — TELEPHONE (OUTPATIENT)
Dept: SURGERY | Age: 57
End: 2022-06-07

## 2022-06-07 NOTE — TELEPHONE ENCOUNTER
The pt has requested the mammogram orders sent to [facility] to schedule and to schedule her follow up.

## 2022-07-01 ENCOUNTER — TELEPHONE (OUTPATIENT)
Dept: SURGERY | Age: 57
End: 2022-07-01

## 2022-07-05 ENCOUNTER — TRANSCRIBE ORDERS (OUTPATIENT)
Dept: ADMINISTRATIVE | Facility: HOSPITAL | Age: 57
End: 2022-07-05

## 2022-07-05 DIAGNOSIS — Z12.31 ENCOUNTER FOR SCREENING MAMMOGRAM FOR MALIGNANT NEOPLASM OF BREAST: Primary | ICD-10-CM

## 2022-07-05 NOTE — TELEPHONE ENCOUNTER
MAYE scheduled at [facility] 7-14-21 at 10:30 with a 10:20 arrival time. Breast exam scheduled with vinnie same day at 1:00. Could not reach patient by phone so I mailed appt info to her today.

## 2022-07-14 ENCOUNTER — OFFICE VISIT (OUTPATIENT)
Dept: SURGERY | Age: 57
End: 2022-07-14
Payer: MEDICARE

## 2022-07-14 ENCOUNTER — HOSPITAL ENCOUNTER (OUTPATIENT)
Dept: MAMMOGRAPHY | Facility: HOSPITAL | Age: 57
Discharge: HOME | End: 2022-07-14
Admitting: PHYSICIAN ASSISTANT

## 2022-07-14 VITALS
SYSTOLIC BLOOD PRESSURE: 134 MMHG | TEMPERATURE: 98 F | WEIGHT: 202 LBS | HEART RATE: 70 BPM | BODY MASS INDEX: 32.47 KG/M2 | HEIGHT: 66 IN | DIASTOLIC BLOOD PRESSURE: 84 MMHG

## 2022-07-14 DIAGNOSIS — Z12.31 ENCOUNTER FOR SCREENING MAMMOGRAM FOR MALIGNANT NEOPLASM OF BREAST: ICD-10-CM

## 2022-07-14 DIAGNOSIS — Z12.31 VISIT FOR SCREENING MAMMOGRAM: Primary | ICD-10-CM

## 2022-07-14 PROCEDURE — 4004F PT TOBACCO SCREEN RCVD TLK: CPT | Performed by: PHYSICIAN ASSISTANT

## 2022-07-14 PROCEDURE — 99213 OFFICE O/P EST LOW 20 MIN: CPT | Performed by: PHYSICIAN ASSISTANT

## 2022-07-14 PROCEDURE — 3017F COLORECTAL CA SCREEN DOC REV: CPT | Performed by: PHYSICIAN ASSISTANT

## 2022-07-14 PROCEDURE — G8419 CALC BMI OUT NRM PARAM NOF/U: HCPCS | Performed by: PHYSICIAN ASSISTANT

## 2022-07-14 PROCEDURE — 77067 SCR MAMMO BI INCL CAD: CPT

## 2022-07-14 PROCEDURE — 77063 BREAST TOMOSYNTHESIS BI: CPT

## 2022-07-14 PROCEDURE — G8427 DOCREV CUR MEDS BY ELIG CLIN: HCPCS | Performed by: PHYSICIAN ASSISTANT

## 2022-07-14 RX ORDER — CLOTRIMAZOLE AND BETAMETHASONE DIPROPIONATE 10; .64 MG/G; MG/G
CREAM TOPICAL
Qty: 45 G | Refills: 5 | Status: SHIPPED | OUTPATIENT
Start: 2022-07-14

## 2022-07-14 NOTE — PROGRESS NOTES
HPI:  Kassi Cisneros is in for follow-up breast check. She has not noticed any changes in her breasts. Her imaging was reviewed and is noted below. Impression  Performed by [facility] RADIOLOGY  Impression:   1.  No mammographic evidence of malignancy. 2.  BI-RADS Final Assessment Category 1: Negative. 3.  Recommend annual screening mammography. This report was finalized on 07/13/2021 13:50 by Dr. Cosme Muller MD.    Narrative  Performed by  [facility]  Examination: Bilateral Digital Screening Mammogram with Computer Aided   Detection,   Bilateral Digital Breast Tomosynthesis     Date of Exam:  7/13/2021 1:47 PM CDT     Indication: Screen     Comparison: Mammogram dated 07/01/2020 04/29/2019, 04/26/2018. Self-reported family history of breast cancer: Maternal aunt with   history of breast cancer. Technique: 2D  and 3 D bilateral digital mammogram images were obtained. Findings:   Breast Density Category (QDC) = B     There are scattered areas of fibroglandular density. No suspicious   findings are present. BREAST EXAM:  On examination, she has fibrocystic changes throughout both breasts, no dominant masses, no skin or nipple changes and no axillary adenopathy. I see nothing suspicious for breast cancer. She has intertrigo under both of her breasts. ASSESSMENT:  Benign fibrocystic changes              PLAN:  I will plan to see her back in 1 year for physical exam and mammograms. She will contact me if anything significant changes. 20 minutes spent, which includes face to face with patient, record review, evaluation, planning, and education.

## 2022-08-30 ENCOUNTER — HOSPITAL ENCOUNTER (EMERGENCY)
Facility: HOSPITAL | Age: 57
Discharge: HOME | End: 2022-08-30
Attending: INTERNAL MEDICINE | Admitting: INTERNAL MEDICINE

## 2022-08-30 VITALS
BODY MASS INDEX: 30.86 KG/M2 | RESPIRATION RATE: 16 BRPM | HEART RATE: 67 BPM | DIASTOLIC BLOOD PRESSURE: 73 MMHG | OXYGEN SATURATION: 100 % | SYSTOLIC BLOOD PRESSURE: 122 MMHG | TEMPERATURE: 98.4 F | HEIGHT: 66 IN | WEIGHT: 192 LBS

## 2022-08-30 DIAGNOSIS — T50.905A DRUG SIDE EFFECTS, INITIAL ENCOUNTER: ICD-10-CM

## 2022-08-30 DIAGNOSIS — R11.2 NAUSEA AND VOMITING, UNSPECIFIED VOMITING TYPE: Primary | ICD-10-CM

## 2022-08-30 LAB
ALBUMIN SERPL-MCNC: 5.2 G/DL (ref 3.5–5.2)
ALBUMIN/GLOB SERPL: 2 G/DL
ALP SERPL-CCNC: 92 U/L (ref 39–117)
ALT SERPL W P-5'-P-CCNC: 12 U/L (ref 1–33)
ANION GAP SERPL CALCULATED.3IONS-SCNC: 11 MMOL/L (ref 5–15)
AST SERPL-CCNC: 12 U/L (ref 1–32)
BASOPHILS # BLD AUTO: 0.05 10*3/MM3 (ref 0–0.2)
BASOPHILS NFR BLD AUTO: 0.4 % (ref 0–1.5)
BILIRUB SERPL-MCNC: 0.6 MG/DL (ref 0–1.2)
BUN SERPL-MCNC: 28 MG/DL (ref 6–20)
BUN/CREAT SERPL: 32.9 (ref 7–25)
CALCIUM SPEC-SCNC: 10 MG/DL (ref 8.6–10.5)
CHLORIDE SERPL-SCNC: 102 MMOL/L (ref 98–107)
CO2 SERPL-SCNC: 28 MMOL/L (ref 22–29)
CREAT SERPL-MCNC: 0.85 MG/DL (ref 0.57–1)
DEPRECATED RDW RBC AUTO: 43.8 FL (ref 37–54)
EGFRCR SERPLBLD CKD-EPI 2021: 80 ML/MIN/1.73
EOSINOPHIL # BLD AUTO: 0.02 10*3/MM3 (ref 0–0.4)
EOSINOPHIL NFR BLD AUTO: 0.1 % (ref 0.3–6.2)
ERYTHROCYTE [DISTWIDTH] IN BLOOD BY AUTOMATED COUNT: 13.1 % (ref 12.3–15.4)
GLOBULIN UR ELPH-MCNC: 2.6 GM/DL
GLUCOSE SERPL-MCNC: 106 MG/DL (ref 65–99)
HCT VFR BLD AUTO: 47.4 % (ref 34–46.6)
HGB BLD-MCNC: 15.2 G/DL (ref 12–15.9)
IMM GRANULOCYTES # BLD AUTO: 0.06 10*3/MM3 (ref 0–0.05)
IMM GRANULOCYTES NFR BLD AUTO: 0.4 % (ref 0–0.5)
LIPASE SERPL-CCNC: 20 U/L (ref 13–60)
LYMPHOCYTES # BLD AUTO: 1.88 10*3/MM3 (ref 0.7–3.1)
LYMPHOCYTES NFR BLD AUTO: 13.9 % (ref 19.6–45.3)
MCH RBC QN AUTO: 29.1 PG (ref 26.6–33)
MCHC RBC AUTO-ENTMCNC: 32.1 G/DL (ref 31.5–35.7)
MCV RBC AUTO: 90.8 FL (ref 79–97)
MONOCYTES # BLD AUTO: 0.83 10*3/MM3 (ref 0.1–0.9)
MONOCYTES NFR BLD AUTO: 6.1 % (ref 5–12)
NEUTROPHILS NFR BLD AUTO: 10.71 10*3/MM3 (ref 1.7–7)
NEUTROPHILS NFR BLD AUTO: 79.1 % (ref 42.7–76)
NRBC BLD AUTO-RTO: 0 /100 WBC (ref 0–0.2)
PLATELET # BLD AUTO: 294 10*3/MM3 (ref 140–450)
PMV BLD AUTO: 9.6 FL (ref 6–12)
POTASSIUM SERPL-SCNC: 3.8 MMOL/L (ref 3.5–5.2)
PROT SERPL-MCNC: 7.8 G/DL (ref 6–8.5)
RBC # BLD AUTO: 5.22 10*6/MM3 (ref 3.77–5.28)
SARS-COV-2 RNA PNL SPEC NAA+PROBE: NOT DETECTED
SODIUM SERPL-SCNC: 141 MMOL/L (ref 136–145)
WBC NRBC COR # BLD: 13.55 10*3/MM3 (ref 3.4–10.8)

## 2022-08-30 PROCEDURE — 25010000002 ONDANSETRON PER 1 MG: Performed by: INTERNAL MEDICINE

## 2022-08-30 PROCEDURE — 96374 THER/PROPH/DIAG INJ IV PUSH: CPT

## 2022-08-30 PROCEDURE — 85025 COMPLETE CBC W/AUTO DIFF WBC: CPT | Performed by: INTERNAL MEDICINE

## 2022-08-30 PROCEDURE — 80053 COMPREHEN METABOLIC PANEL: CPT | Performed by: INTERNAL MEDICINE

## 2022-08-30 PROCEDURE — 87635 SARS-COV-2 COVID-19 AMP PRB: CPT | Performed by: INTERNAL MEDICINE

## 2022-08-30 PROCEDURE — 99283 EMERGENCY DEPT VISIT LOW MDM: CPT

## 2022-08-30 PROCEDURE — 83690 ASSAY OF LIPASE: CPT | Performed by: INTERNAL MEDICINE

## 2022-08-30 RX ORDER — ONDANSETRON 4 MG/1
4 TABLET, ORALLY DISINTEGRATING ORAL 4 TIMES DAILY PRN
Qty: 15 TABLET | Refills: 0 | Status: SHIPPED | OUTPATIENT
Start: 2022-08-30 | End: 2022-08-30 | Stop reason: SDUPTHER

## 2022-08-30 RX ORDER — ONDANSETRON 2 MG/ML
4 INJECTION INTRAMUSCULAR; INTRAVENOUS ONCE
Status: COMPLETED | OUTPATIENT
Start: 2022-08-30 | End: 2022-08-30

## 2022-08-30 RX ORDER — ONDANSETRON 4 MG/1
4 TABLET, ORALLY DISINTEGRATING ORAL 4 TIMES DAILY PRN
Qty: 20 TABLET | Refills: 0 | Status: SHIPPED | OUTPATIENT
Start: 2022-08-30

## 2022-08-30 RX ADMIN — SODIUM CHLORIDE 1000 ML: 9 INJECTION, SOLUTION INTRAVENOUS at 13:05

## 2022-08-30 RX ADMIN — ONDANSETRON 4 MG: 2 INJECTION INTRAMUSCULAR; INTRAVENOUS at 13:06

## 2023-03-07 ENCOUNTER — TELEPHONE (OUTPATIENT)
Dept: GASTROENTEROLOGY | Facility: CLINIC | Age: 58
End: 2023-03-07
Payer: MEDICARE

## 2023-03-07 NOTE — TELEPHONE ENCOUNTER
Pt called me just now stating she thinks she is having a diverticulitis flare. She has been having bilateral lower abdominal pain that radiates to her lower back for the last 5 days-has been trying to self treat with Tylenol but isn't getting any better. She also tells me her heart rate goes up when she does anything-states she did some light sweeping and it went up to 161. No fever, n/v, BRBPR, or any other complaints at this time.     I spoke to Clau about it-pt is scheduled for an appt 4/19/2023 and we have her on the cancellation list. Clau recommends that pt call PCP to see if they can get a CT on her to make sure she has diverticulitis or treat her however they feel is appropriate before she can get in here with us. I did advise if still having issues with heart rate or with any worsening symptoms to go to ER. Pt advised to call me back with any further questions/problems.

## 2023-03-14 ENCOUNTER — OFFICE VISIT (OUTPATIENT)
Dept: GASTROENTEROLOGY | Facility: CLINIC | Age: 58
End: 2023-03-14
Payer: MEDICARE

## 2023-03-14 VITALS
TEMPERATURE: 96.8 F | BODY MASS INDEX: 28.12 KG/M2 | DIASTOLIC BLOOD PRESSURE: 80 MMHG | HEIGHT: 66 IN | SYSTOLIC BLOOD PRESSURE: 118 MMHG | OXYGEN SATURATION: 99 % | HEART RATE: 70 BPM | WEIGHT: 175 LBS

## 2023-03-14 DIAGNOSIS — R10.32 LEFT LOWER QUADRANT ABDOMINAL PAIN: Primary | ICD-10-CM

## 2023-03-14 PROCEDURE — 99203 OFFICE O/P NEW LOW 30 MIN: CPT | Performed by: NURSE PRACTITIONER

## 2023-03-14 PROCEDURE — 1159F MED LIST DOCD IN RCRD: CPT | Performed by: NURSE PRACTITIONER

## 2023-03-14 PROCEDURE — 1160F RVW MEDS BY RX/DR IN RCRD: CPT | Performed by: NURSE PRACTITIONER

## 2023-03-14 NOTE — PROGRESS NOTES
"Primary Physician: Navdeep Armenta Jr., MD    Chief Complaint   Patient presents with   • Abdominal Pain     Pt has been having bilateral lower abdominal pain for the last 1 1/2 weeks-was concerned about diverticulitis flare so PCP is treating her with Augmentin BID X 10 days-PCP is also trying to order CT but is currently fighting with insurance; Pt states she is feeling a little better but is still having pain and not eating much; Pt's last colon was 7/26/2019         Subjective     Lucero Mock is a 57 y.o. female.    HPI   Abdominal Pain  Patient developed left-sided lower abdominal pain that did radiate to the right lower quadrant.  Saw her PCP for suspected diverticulitis.  She has had diverticulitis in the past.  He began her on Augmentin twice daily for 10 days.  She still has 3 more days left.  Pain was a \"8\" at its worse. Today pain down to a \"3\". She still has 3 days left.    Today she is close to resolution of pain close.  No blood per rectum.  No fever.    Last colonoscopy 7/26/2019: multiple medium mouthed diverticula left colon, otherwise normal exam.    Past Medical History:   Diagnosis Date   • Anxiety    • Arthritis    • Diabetes mellitus (HCC)    • Disease of thyroid gland    • Diverticulosis    • Endometriosis    • Esophageal reflux    • Heart disease    • Hypoglycemia    • IBS (irritable bowel syndrome)    • Panic disorder    • PONV (postoperative nausea and vomiting)    • SVT (supraventricular tachycardia) (HCC)        Past Surgical History:   Procedure Laterality Date   • BREAST BIOPSY Bilateral 2005    Benign   • BREAST EXCISIONAL BIOPSY Left 2014    Benign   • CARDIAC ABLATION  09/2020   • COLONOSCOPY N/A 10/28/2016    Diverticulosis in the sigmoid colon; The examination was otherwise normal on direct and retroflexion views; No specimens collected; Repeat 10 years   • COLONOSCOPY N/A 07/26/2019    Diverticulosis in the left colon; The examination was otherwise normal on direct and " retroflexion views; No specimens collected; Repeat 10 years   • DIAGNOSTIC LAPAROSCOPY EXPLORATORY LAPAROTOMY      For endometriosis   • HYSTERECTOMY  2012        Current Outpatient Medications:   •  ALPRAZolam (XANAX) 0.5 MG tablet, Take 1 tablet by mouth 2 (Two) Times a Day As Needed for Anxiety., Disp: , Rfl:   •  amitriptyline (ELAVIL) 25 MG tablet, Take 1 tablet by mouth every night at bedtime., Disp: , Rfl:   •  Biotin 1 MG capsule, Take 1 tablet by mouth., Disp: , Rfl:   •  Calcium Carbonate-Vit D-Min (CALTRATE 600+D PLUS PO), Take 600 mg by mouth daily., Disp: , Rfl:   •  cetirizine (zyrTEC) 10 MG tablet, Take 1 tablet by mouth Daily., Disp: , Rfl:   •  dicyclomine (BENTYL) 20 MG tablet, Take 1 tablet by mouth As Needed., Disp: , Rfl:   •  fluticasone (FLONASE) 50 MCG/ACT nasal spray, 2 sprays into the nostril(s) as directed by provider Daily., Disp: , Rfl:   •  furosemide (LASIX) 20 MG tablet, Take 1 tablet by mouth As Needed., Disp: , Rfl:   •  Januvia 100 MG tablet, Take 1 tablet by mouth Daily., Disp: , Rfl:   •  levothyroxine (SYNTHROID, LEVOTHROID) 50 MCG tablet, Take 1 tablet by mouth Daily., Disp: , Rfl:   •  metroNIDAZOLE (METROCREAM) 0.75 % cream, Apply  topically to the appropriate area as directed 2 (Two) Times a Day., Disp: , Rfl:   •  naproxen (NAPROSYN) 500 MG tablet, Take 1 tablet by mouth As Needed., Disp: , Rfl:   •  ondansetron ODT (ZOFRAN-ODT) 4 MG disintegrating tablet, Place 1 tablet on the tongue 4 (Four) Times a Day As Needed for Nausea., Disp: 20 tablet, Rfl: 0  •  ONE TOUCH ULTRA TEST test strip, USE ONCE DAILY AS NEEDED, Disp: , Rfl: 11  •  pantoprazole (PROTONIX) 40 MG EC tablet, Take 1 tablet by mouth Daily., Disp: , Rfl: 2  •  Probiotic Product (PROBIOTIC-10 PO), Take 1 tablet by mouth Daily., Disp: , Rfl:   •  sertraline (ZOLOFT) 100 MG tablet, Take 1 tablet by mouth Daily., Disp: , Rfl:     Allergies   Allergen Reactions   • Flagyl [Metronidazole] GI Intolerance   • Metformin  "Dizziness   • Latex Rash       Social History     Socioeconomic History   • Marital status:    Tobacco Use   • Smoking status: Never   • Smokeless tobacco: Never   Vaping Use   • Vaping Use: Never used   Substance and Sexual Activity   • Alcohol use: Yes     Comment: Rarely   • Drug use: No   • Sexual activity: Defer       Family History   Problem Relation Age of Onset   • Crohn's disease Mother    • Irritable bowel syndrome Mother    • Cancer Father    • Breast cancer Maternal Aunt    • Breast cancer Maternal Cousin    • Celiac disease Neg Hx    • Cirrhosis Neg Hx    • Colon cancer Neg Hx    • Esophageal cancer Neg Hx    • Liver cancer Neg Hx    • Liver disease Neg Hx    • Rectal cancer Neg Hx    • Stomach cancer Neg Hx    • Colon polyps Neg Hx        Review of Systems   Constitutional: Negative for fever and unexpected weight change.   Gastrointestinal: Positive for abdominal pain. Negative for blood in stool.       Objective     /80 (BP Location: Left arm, Patient Position: Sitting, Cuff Size: Adult)   Pulse 70   Temp 96.8 °F (36 °C) (Infrared)   Ht 167.6 cm (66\")   Wt 79.4 kg (175 lb)   SpO2 99%   Breastfeeding No   BMI 28.25 kg/m²     Physical Exam  Vitals reviewed.   Constitutional:       Appearance: Normal appearance.   Abdominal:      General: Abdomen is flat. Bowel sounds are normal. There is distension.      Palpations: Abdomen is soft.      Comments: Minimal tenderness in the left lower abdomen   Neurological:      Mental Status: She is alert.         Lab Results - Last 18 Months   Lab Units 08/30/22  1303   GLUCOSE mg/dL 106*   BUN mg/dL 28*   CREATININE mg/dL 0.85   SODIUM mmol/L 141   POTASSIUM mmol/L 3.8   CHLORIDE mmol/L 102   CO2 mmol/L 28.0   TOTAL PROTEIN g/dL 7.8   ALBUMIN g/dL 5.20   ALT (SGPT) U/L 12   AST (SGOT) U/L 12   ALK PHOS U/L 92   BILIRUBIN mg/dL 0.6   GLOBULIN gm/dL 2.6       Lab Results - Last 18 Months   Lab Units 08/30/22  1303   HEMOGLOBIN g/dL 15.2 "   HEMATOCRIT % 47.4*   MCV fL 90.8   WBC 10*3/mm3 13.55*   RDW % 13.1   MPV fL 9.6   PLATELETS 10*3/mm3 294           IMPRESSION/PLAN:    Assessment & Plan      Problem List Items Addressed This Visit    None    Finish antibiotics.  Remain on soft diet for the next week.  She is to call me with any fever or worsening of abdominal pain.  She has CT Scan of Abdomen already ordered through her PCP office and we will await results of that.  Educated on Diverticular Disease.  I want her to start supplemental fiber in 1 month as a daily regimen to prevent any future diverticulitis attacks.  Follow Up 3 months and we will re group                Juliane Pickett, APRN  03/14/23  15:21 CDT    Part of this note may be an electronic transcription/translation of spoken language to printed text.

## 2023-03-17 ENCOUNTER — TRANSCRIBE ORDERS (OUTPATIENT)
Dept: ADMINISTRATIVE | Facility: HOSPITAL | Age: 58
End: 2023-03-17
Payer: MEDICARE

## 2023-03-17 DIAGNOSIS — K57.32 DIVERTICULITIS OF LARGE INTESTINE WITHOUT PERFORATION OR ABSCESS WITHOUT BLEEDING: Primary | ICD-10-CM

## 2023-03-20 ENCOUNTER — HOSPITAL ENCOUNTER (OUTPATIENT)
Dept: CT IMAGING | Facility: HOSPITAL | Age: 58
Discharge: HOME | End: 2023-03-20
Admitting: FAMILY MEDICINE
Payer: MEDICARE

## 2023-03-20 DIAGNOSIS — K57.32 DIVERTICULITIS OF LARGE INTESTINE WITHOUT PERFORATION OR ABSCESS WITHOUT BLEEDING: ICD-10-CM

## 2023-03-20 LAB — CREAT BLDA-MCNC: 0.8 MG/DL (ref 0.6–1.3)

## 2023-03-20 PROCEDURE — 82565 ASSAY OF CREATININE: CPT

## 2023-03-20 PROCEDURE — 74177 CT ABD & PELVIS W/CONTRAST: CPT

## 2023-03-20 PROCEDURE — 25510000001 IOPAMIDOL 61 % SOLUTION: Performed by: FAMILY MEDICINE

## 2023-03-20 RX ADMIN — IOPAMIDOL 100 ML: 612 INJECTION, SOLUTION INTRAVENOUS at 15:37

## 2023-04-06 ENCOUNTER — TRANSCRIBE ORDERS (OUTPATIENT)
Dept: ADMINISTRATIVE | Facility: HOSPITAL | Age: 58
End: 2023-04-06
Payer: MEDICARE

## 2023-04-06 DIAGNOSIS — Z12.31 SCREENING MAMMOGRAM FOR BREAST CANCER: Primary | ICD-10-CM

## 2023-06-14 ENCOUNTER — OFFICE VISIT (OUTPATIENT)
Dept: GASTROENTEROLOGY | Facility: CLINIC | Age: 58
End: 2023-06-14
Payer: MEDICARE

## 2023-06-14 VITALS
OXYGEN SATURATION: 98 % | TEMPERATURE: 97.3 F | HEART RATE: 65 BPM | HEIGHT: 66 IN | SYSTOLIC BLOOD PRESSURE: 116 MMHG | WEIGHT: 181 LBS | DIASTOLIC BLOOD PRESSURE: 74 MMHG | BODY MASS INDEX: 29.09 KG/M2

## 2023-06-14 DIAGNOSIS — K57.90 DIVERTICULOSIS: Primary | ICD-10-CM

## 2023-06-14 PROCEDURE — 1160F RVW MEDS BY RX/DR IN RCRD: CPT | Performed by: NURSE PRACTITIONER

## 2023-06-14 PROCEDURE — 99213 OFFICE O/P EST LOW 20 MIN: CPT | Performed by: NURSE PRACTITIONER

## 2023-06-14 PROCEDURE — 1159F MED LIST DOCD IN RCRD: CPT | Performed by: NURSE PRACTITIONER

## 2023-06-14 NOTE — PROGRESS NOTES
Primary Physician: Navdeep Armenta Jr., MD    Chief Complaint   Patient presents with    Follow-up     Pt presents today for lower abdominal pain follow up-had CT for PCP 3/20/2023 and pt started fiber 2 tabs/nightly 4/14/2023-states the pain has mostly resolved but now she is having more frequent BM's        Subjective     Lucero Mock is a 57 y.o. female.    HPI  Abdominal pain  Patient here for follow-up last seen 3/14/2023 when she had complaints of left-sided abdominal pain that radiated to her right lower quadrant.  Her PCP suspected diverticulitis as she had had that in the past.  She was treated by her former primary care provider with Augmentin for 10 days.  When I saw her the pain was close to resolving and she had no blood per rectum or fever.  Last colonoscopy 7/26/2019: multiple medium mouthed diverticula left colon, otherwise normal exam. Pt also had a normal colonoscopy 10/2016.    CT scan of the abdomen on 3/20/2023 showed colonic diverticulosis with no acute diverticulitis.  There was no acute process in the abdomen or pelvis at all.    I did educate her on the importance of supplemental fiber therapy given her diverticulosis just to try to prevent future diverticulitis attacks.    TODAY, she states the abdominal pain has COMPLETELY resolved. She denies any constipation.  She is taking fiber tablets 2 per night.  She usually has 2 BM's in the morning hours. No fever.  NO family hx colon cancer.    Past Medical History:   Diagnosis Date    Anxiety     Arthritis     Diabetes mellitus     Disease of thyroid gland     Diverticulosis     Endometriosis     Esophageal reflux     Heart disease     Hypoglycemia     IBS (irritable bowel syndrome)     Panic disorder     PONV (postoperative nausea and vomiting)     SVT (supraventricular tachycardia)        Past Surgical History:   Procedure Laterality Date    BREAST BIOPSY Bilateral 2005    Benign    BREAST EXCISIONAL BIOPSY Left 2014    Benign     CARDIAC ABLATION  09/2020    COLONOSCOPY N/A 10/28/2016    Diverticulosis in the sigmoid colon; The examination was otherwise normal on direct and retroflexion views; No specimens collected; Repeat 10 years    COLONOSCOPY N/A 07/26/2019    Diverticulosis in the left colon; The examination was otherwise normal on direct and retroflexion views; No specimens collected; Repeat 10 years    DIAGNOSTIC LAPAROSCOPY EXPLORATORY LAPAROTOMY      For endometriosis    HYSTERECTOMY  2012        Current Outpatient Medications:     ALPRAZolam (XANAX) 0.5 MG tablet, Take 1 tablet by mouth 2 (Two) Times a Day As Needed for Anxiety., Disp: , Rfl:     amitriptyline (ELAVIL) 25 MG tablet, Take 1 tablet by mouth every night at bedtime., Disp: , Rfl:     Biotin 1 MG capsule, Take 1 tablet by mouth., Disp: , Rfl:     Calcium Carbonate-Vit D-Min (CALTRATE 600+D PLUS PO), Take 600 mg by mouth daily., Disp: , Rfl:     cetirizine (zyrTEC) 10 MG tablet, Take 1 tablet by mouth Daily., Disp: , Rfl:     dicyclomine (BENTYL) 20 MG tablet, Take 1 tablet by mouth As Needed., Disp: , Rfl:     fluticasone (FLONASE) 50 MCG/ACT nasal spray, 2 sprays into the nostril(s) as directed by provider Daily., Disp: , Rfl:     furosemide (LASIX) 20 MG tablet, Take 1 tablet by mouth As Needed., Disp: , Rfl:     Inulin (FIBER CHOICE PO), Take 2 tablets by mouth Every Night., Disp: , Rfl:     Januvia 100 MG tablet, Take 1 tablet by mouth Daily., Disp: , Rfl:     levothyroxine (SYNTHROID, LEVOTHROID) 50 MCG tablet, Take 1 tablet by mouth Daily., Disp: , Rfl:     metroNIDAZOLE (METROCREAM) 0.75 % cream, Apply  topically to the appropriate area as directed 2 (Two) Times a Day., Disp: , Rfl:     naproxen (NAPROSYN) 500 MG tablet, Take 1 tablet by mouth As Needed., Disp: , Rfl:     ondansetron ODT (ZOFRAN-ODT) 4 MG disintegrating tablet, Place 1 tablet on the tongue 4 (Four) Times a Day As Needed for Nausea., Disp: 20 tablet, Rfl: 0    ONE TOUCH ULTRA TEST test strip, USE  "ONCE DAILY AS NEEDED, Disp: , Rfl: 11    pantoprazole (PROTONIX) 40 MG EC tablet, Take 1 tablet by mouth Daily., Disp: , Rfl: 2    Probiotic Product (PROBIOTIC-10 PO), Take 1 tablet by mouth Daily., Disp: , Rfl:     sertraline (ZOLOFT) 100 MG tablet, Take 1 tablet by mouth Daily., Disp: , Rfl:     Allergies   Allergen Reactions    Flagyl [Metronidazole] GI Intolerance    Metformin Dizziness    Latex Rash       Social History     Socioeconomic History    Marital status: Single   Tobacco Use    Smoking status: Never    Smokeless tobacco: Never   Vaping Use    Vaping Use: Never used   Substance and Sexual Activity    Alcohol use: Yes     Comment: Rarely    Drug use: No    Sexual activity: Defer       Family History   Problem Relation Age of Onset    Crohn's disease Mother     Irritable bowel syndrome Mother     Cancer Father     Breast cancer Maternal Aunt     Breast cancer Maternal Cousin     Celiac disease Neg Hx     Cirrhosis Neg Hx     Colon cancer Neg Hx     Esophageal cancer Neg Hx     Liver cancer Neg Hx     Liver disease Neg Hx     Rectal cancer Neg Hx     Stomach cancer Neg Hx     Colon polyps Neg Hx        Review of Systems   Constitutional:  Negative for fever and unexpected weight change.   Gastrointestinal:  Negative for abdominal pain and constipation.     Objective     /74 (BP Location: Left arm, Patient Position: Sitting, Cuff Size: Adult)   Pulse 65   Temp 97.3 °F (36.3 °C) (Infrared)   Ht 167.6 cm (66\")   Wt 82.1 kg (181 lb)   SpO2 98%   Breastfeeding No   BMI 29.21 kg/m²     Physical Exam  Vitals reviewed.   Constitutional:       Appearance: Normal appearance.   Abdominal:      General: Abdomen is flat. There is no distension.      Palpations: Abdomen is soft.      Tenderness: There is no abdominal tenderness.   Neurological:      Mental Status: She is alert.       Lab Results - Last 18 Months   Lab Units 03/20/23  1503 08/30/22  1303   GLUCOSE mg/dL  --  106*   BUN mg/dL  --  28* "   CREATININE mg/dL 0.80 0.85   SODIUM mmol/L  --  141   POTASSIUM mmol/L  --  3.8   CHLORIDE mmol/L  --  102   CO2 mmol/L  --  28.0   TOTAL PROTEIN g/dL  --  7.8   ALBUMIN g/dL  --  5.20   ALT (SGPT) U/L  --  12   AST (SGOT) U/L  --  12   ALK PHOS U/L  --  92   BILIRUBIN mg/dL  --  0.6   GLOBULIN gm/dL  --  2.6       Lab Results - Last 18 Months   Lab Units 08/30/22  1303   HEMOGLOBIN g/dL 15.2   HEMATOCRIT % 47.4*   MCV fL 90.8   WBC 10*3/mm3 13.55*   RDW % 13.1   MPV fL 9.6   PLATELETS 10*3/mm3 294       Narrative & Impression   CT ABDOMEN PELVIS W CONTRAST- 3/20/2023 3:03 PM CDT     HISTORY: K57.32; K57.32-Diverticulitis of large intestine without  perforation or abscess without bleeding       COMPARISON: CT scan dated 05/31/2019. CT scan dated 04/16/2018.     DOSE LENGTH PRODUCT: 293 mGy cm. Automated exposure control was also  utilized to decrease patient radiation dose.     TECHNIQUE: Following the intravenous administration of contrast, helical  CT tomographic images of the abdomen and pelvis were acquired.  Multiplanar reformatted images were provided for review.      FINDINGS:   LOWER CHEST: The lung bases and included mediastinal structures are  unremarkable.      LIVER: 1.6 cm segment 4 liver lesion with peripheral nodular  enhancement, favor hemangioma. This is stable from 2018. Otherwise  unremarkable. Portal and hepatic veins are patent.     BILIARY SYSTEM: The gallbladder is unremarkable. No intrahepatic or  extrahepatic ductal dilatation.      PANCREAS: No focal pancreatic lesion.      SPLEEN: Unremarkable.      KIDNEYS AND ADRENALS: There are 2 left adrenal nodules, the larger  measuring 1.1 cm diameter. Both of these are slightly larger. Right  adrenal gland is unremarkable. Solitary benign exophytic right renal  cyst for which no follow-up imaging is recommended. Kidneys are  otherwise unremarkable. No hydronephrosis.     RETROPERITONEUM: No mass, lymphadenopathy or hemorrhage.      GI TRACT: The  stomach is nondistended. Small bowel is nondilated.  Descending and sigmoid colon diverticulosis without acute  diverticulitis.     OTHER: There is no mesenteric mass, lymphadenopathy or fluid collection.  The abdominopelvic vasculature is patent. Grade 2 lytic  spondylolisthesis at L5-S1. No acute bony abnormality.      PELVIS: No mass lesion, fluid collection or significant lymphadenopathy  is seen in the pelvis. The urinary bladder is normal in appearance.     IMPRESSION:  1. No acute process in the abdomen or pelvis.   2. Colonic diverticulosis without acute diverticulitis.  3. Incidental segment 4 liver hemangioma.  4. There are 2 small left adrenal nodules which are very slightly  increased in size from 2018, likely incidental adrenal adenomas.        This report was finalized on 03/20/2023 15:57 by Dr Beny Rock,          IMPRESSION/PLAN:    Assessment & Plan      Problem List Items Addressed This Visit          Gastrointestinal Abdominal     Diverticulosis - Primary    Overview     CT of the abdomen and pelvis 3/20/2023 with colonic diverticulosis no acute abdominal process         Current Assessment & Plan     Continue Metamucil daily          Follow up in 6 months  Educated on the importance of continuing fiber therapy daily and avoiding constipaiton  She is instructed to call me immediately if she thinks she is having a flare of diverticulitis, put herself on clear liq diet, or if fever and severe pain go immediately to the ER.                  Juliane Pickett, APRN  06/14/23  09:56 CDT    Part of this note may be an electronic transcription/translation of spoken language to printed text.

## 2023-07-17 DIAGNOSIS — Z12.31 VISIT FOR SCREENING MAMMOGRAM: ICD-10-CM

## 2023-07-26 NOTE — PROGRESS NOTES
David Luo comes today for her follow-up breast exam.  She has had no new breast complaints. She reports no new palpable masses. There is no skin or nipple changes. There is no nipple discharge. She has no appreciable evidence of supraclavicular or axillary adenopathy. Patient Active Problem List    Diagnosis Date Noted    AVNRT (AV renzo re-entry tachycardia) ([address]) 09/07/2016    Hypothyroidism 09/07/2016    Lobular carcinoma in situ 07/21/2014    Atypical lobular hyperplasia of breast 06/17/2014    Abnormal mammogram 05/23/2014    Mammographic microcalcification 05/23/2014       Current Outpatient Medications   Medication Sig Dispense Refill    clotrimazole-betamethasone (LOTRISONE) 1-0.05 % cream Apply topically 2 times daily. 45 g 5    cetirizine (ZYRTEC) 10 MG tablet Take 10 mg by mouth daily      fluticasone (FLONASE) 50 MCG/ACT nasal spray USE 1 SPRAY IN EACH NOSTRIL ONCE DAILY AS NEEDED. **60 DAY SUPPLY**      naproxen (NAPROSYN) 500 MG tablet Take 500 mg by mouth 2 times daily (with meals) (Patient not taking: Reported on 7/13/2021)      dicyclomine (BENTYL) 20 MG tablet       alogliptin (NESINA) 25 MG TABS tablet       pantoprazole (PROTONIX) 20 MG tablet Take 20 mg by mouth daily      metoprolol tartrate (LOPRESSOR) 25 MG tablet Take 1.5 tablets by mouth 2 times daily (Patient not taking: Reported on 7/14/2022) 90 tablet 5    Loratadine (CLARITIN) 10 MG CAPS Take 10 mg by mouth daily (Patient not taking: Reported on 7/14/2022)      sertraline (ZOLOFT) 50 MG tablet Take 50 mg by mouth daily      ALPRAZolam (XANAX) 0.5 MG tablet nightly as needed       levothyroxine (SYNTHROID) 50 MCG tablet   11    aspirin 81 MG tablet Take 81 mg by mouth daily. (Patient not taking: Reported on 7/13/2021)      calcium carbonate (OSCAL) 500 MG TABS tablet Take 500 mg by mouth daily. Glucosamine-Chondroit-Vit C-Mn (GLUCOSAMINE CHONDR 500 COMPLEX PO) Take  by mouth.  (Patient not taking: Reported on

## 2023-07-31 ENCOUNTER — OFFICE VISIT (OUTPATIENT)
Dept: SURGERY | Age: 58
End: 2023-07-31
Payer: MEDICARE

## 2023-07-31 VITALS
SYSTOLIC BLOOD PRESSURE: 118 MMHG | HEIGHT: 66 IN | WEIGHT: 179 LBS | DIASTOLIC BLOOD PRESSURE: 70 MMHG | BODY MASS INDEX: 28.77 KG/M2 | TEMPERATURE: 98.1 F

## 2023-07-31 DIAGNOSIS — Z12.31 VISIT FOR SCREENING MAMMOGRAM: Primary | ICD-10-CM

## 2023-07-31 PROCEDURE — G8419 CALC BMI OUT NRM PARAM NOF/U: HCPCS | Performed by: PHYSICIAN ASSISTANT

## 2023-07-31 PROCEDURE — 3017F COLORECTAL CA SCREEN DOC REV: CPT | Performed by: PHYSICIAN ASSISTANT

## 2023-07-31 PROCEDURE — G8427 DOCREV CUR MEDS BY ELIG CLIN: HCPCS | Performed by: PHYSICIAN ASSISTANT

## 2023-07-31 PROCEDURE — 99213 OFFICE O/P EST LOW 20 MIN: CPT | Performed by: PHYSICIAN ASSISTANT

## 2023-07-31 PROCEDURE — 1036F TOBACCO NON-USER: CPT | Performed by: PHYSICIAN ASSISTANT

## 2023-12-14 ENCOUNTER — OFFICE VISIT (OUTPATIENT)
Dept: GASTROENTEROLOGY | Facility: CLINIC | Age: 58
End: 2023-12-14
Payer: MEDICARE

## 2023-12-14 VITALS
BODY MASS INDEX: 26.68 KG/M2 | WEIGHT: 166 LBS | SYSTOLIC BLOOD PRESSURE: 116 MMHG | DIASTOLIC BLOOD PRESSURE: 70 MMHG | HEART RATE: 61 BPM | TEMPERATURE: 98.2 F | OXYGEN SATURATION: 99 % | HEIGHT: 66 IN

## 2023-12-14 DIAGNOSIS — K57.90 DIVERTICULOSIS: Primary | ICD-10-CM

## 2023-12-14 PROCEDURE — 99213 OFFICE O/P EST LOW 20 MIN: CPT | Performed by: NURSE PRACTITIONER

## 2023-12-14 PROCEDURE — 1160F RVW MEDS BY RX/DR IN RCRD: CPT | Performed by: NURSE PRACTITIONER

## 2023-12-14 PROCEDURE — 1159F MED LIST DOCD IN RCRD: CPT | Performed by: NURSE PRACTITIONER

## 2023-12-14 RX ORDER — PSYLLIUM HUSK 0.4 G
2 CAPSULE ORAL DAILY
COMMUNITY

## 2023-12-14 NOTE — ASSESSMENT & PLAN NOTE
Sinew daily Metamucil therapy.  Avoid all nuts, seeds, foods with adder shell.  Popcorn and Trail Mix have been the trigger for her previous diverticulitis of attacks and she is avoiding these at this time.

## 2023-12-14 NOTE — PROGRESS NOTES
Primary Physician: Navdeep Armenta Jr., MD    Chief Complaint   Patient presents with    Follow-up     Pt presents today for abdominal pain follow up-states she does have occasional cramping related to certain foods but she is trying avoid those-states overall she is feeling good and the metamucil is definitely helping        Subjective     Lucero Mock is a 58 y.o. female.    HPI  Diverticulosis  Patient seen in March with left-sided abdominal pain.  It was suspected diverticulitis at that time and her PCP treated her with Augmentin for 10 days.  She was referred to our office.    CT scan of the abdomen on 3/20/2023 showed colonic diverticulosis with no acute diverticulitis.  There was no acute process in the abdomen or pelvis at all.     Follow-up in June 2023 patient was doing well and her abdominal pain had resolved resolved completely.  Education on the importance of supplemental fiber for her diverticulosis.  Educated on how to avoid diverticulitis attacks and avoidance of certain types of foods.    Last colonoscopy 7/26/2019: multiple medium mouthed diverticula left colon, otherwise normal exam. Pt also had a normal colonoscopy 10/2016.   No family history of colon cancer to report.    TODAY, pt is pain free. Having no abdominal pain. Reports having daily normal BM's with no signs of blood. Using Metamucil therapy daily.    Past Medical History:   Diagnosis Date    Anxiety     Arthritis     Diabetes mellitus     Disease of thyroid gland     Diverticulosis     Endometriosis     Esophageal reflux     Heart disease     Hypoglycemia     IBS (irritable bowel syndrome)     Panic disorder     PONV (postoperative nausea and vomiting)     SVT (supraventricular tachycardia)        Past Surgical History:   Procedure Laterality Date    BREAST BIOPSY Bilateral 2005    Benign    BREAST EXCISIONAL BIOPSY Left 2014    Benign    CARDIAC ABLATION  09/2020    COLONOSCOPY N/A 10/28/2016    Diverticulosis in the  sigmoid colon; The examination was otherwise normal on direct and retroflexion views; No specimens collected; Repeat 10 years    COLONOSCOPY N/A 07/26/2019    Diverticulosis in the left colon; The examination was otherwise normal on direct and retroflexion views; No specimens collected; Repeat 10 years    DIAGNOSTIC LAPAROSCOPY EXPLORATORY LAPAROTOMY      For endometriosis    HYSTERECTOMY  2012        Current Outpatient Medications:     ALPRAZolam (XANAX) 0.5 MG tablet, Take 1 tablet by mouth 2 (Two) Times a Day As Needed for Anxiety., Disp: , Rfl:     amitriptyline (ELAVIL) 25 MG tablet, Take 1 tablet by mouth every night at bedtime., Disp: , Rfl:     Biotin 1 MG capsule, Take 1 tablet by mouth., Disp: , Rfl:     Calcium Carbonate-Vit D-Min (CALTRATE 600+D PLUS PO), Take 600 mg by mouth daily., Disp: , Rfl:     cetirizine (zyrTEC) 10 MG tablet, Take 1 tablet by mouth Daily., Disp: , Rfl:     dicyclomine (BENTYL) 20 MG tablet, Take 1 tablet by mouth As Needed., Disp: , Rfl:     fluticasone (FLONASE) 50 MCG/ACT nasal spray, 2 sprays into the nostril(s) as directed by provider Daily., Disp: , Rfl:     furosemide (LASIX) 20 MG tablet, Take 1 tablet by mouth As Needed., Disp: , Rfl:     Januvia 100 MG tablet, Take 1 tablet by mouth Daily., Disp: , Rfl:     levothyroxine (SYNTHROID, LEVOTHROID) 50 MCG tablet, Take 1 tablet by mouth Daily., Disp: , Rfl:     metroNIDAZOLE (METROCREAM) 0.75 % cream, Apply  topically to the appropriate area as directed 2 (Two) Times a Day., Disp: , Rfl:     naproxen (NAPROSYN) 500 MG tablet, Take 1 tablet by mouth As Needed., Disp: , Rfl:     ondansetron ODT (ZOFRAN-ODT) 4 MG disintegrating tablet, Place 1 tablet on the tongue 4 (Four) Times a Day As Needed for Nausea., Disp: 20 tablet, Rfl: 0    ONE TOUCH ULTRA TEST test strip, USE ONCE DAILY AS NEEDED, Disp: , Rfl: 11    pantoprazole (PROTONIX) 40 MG EC tablet, Take 1 tablet by mouth Daily., Disp: , Rfl: 2    Probiotic Product  "(PROBIOTIC-10 PO), Take 1 tablet by mouth Daily., Disp: , Rfl:     Psyllium (Metamucil) 0.36 g capsule, Take 2 capsules by mouth Daily., Disp: , Rfl:     sertraline (ZOLOFT) 100 MG tablet, Take 1 tablet by mouth Daily., Disp: , Rfl:     Allergies   Allergen Reactions    Flagyl [Metronidazole] GI Intolerance    Metformin Dizziness    Latex Rash       Social History     Socioeconomic History    Marital status: Single   Tobacco Use    Smoking status: Never    Smokeless tobacco: Never   Vaping Use    Vaping Use: Never used   Substance and Sexual Activity    Alcohol use: Yes     Comment: Rarely    Drug use: No    Sexual activity: Defer       Family History   Problem Relation Age of Onset    Crohn's disease Mother     Irritable bowel syndrome Mother     Cancer Father     Breast cancer Maternal Aunt     Breast cancer Maternal Cousin     Celiac disease Neg Hx     Cirrhosis Neg Hx     Colon cancer Neg Hx     Esophageal cancer Neg Hx     Liver cancer Neg Hx     Liver disease Neg Hx     Rectal cancer Neg Hx     Stomach cancer Neg Hx     Colon polyps Neg Hx        Review of Systems   Constitutional:  Negative for unexpected weight change.   Gastrointestinal:  Negative for abdominal pain, blood in stool, constipation and diarrhea.       Objective     /70 (BP Location: Left arm, Patient Position: Sitting, Cuff Size: Adult)   Pulse 61   Temp 98.2 °F (36.8 °C) (Infrared)   Ht 167.6 cm (66\")   Wt 75.3 kg (166 lb)   SpO2 99%   Breastfeeding No   BMI 26.79 kg/m²     Physical Exam  Vitals reviewed.   Constitutional:       Appearance: Normal appearance.   Neurological:      Mental Status: She is alert.         Lab Results - Last 18 Months   Lab Units 03/20/23  1503 08/30/22  1303   GLUCOSE mg/dL  --  106*   BUN mg/dL  --  28*   CREATININE mg/dL 0.80 0.85   SODIUM mmol/L  --  141   POTASSIUM mmol/L  --  3.8   CHLORIDE mmol/L  --  102   CO2 mmol/L  --  28.0   TOTAL PROTEIN g/dL  --  7.8   ALBUMIN g/dL  --  5.20   ALT (SGPT) " U/L  --  12   AST (SGOT) U/L  --  12   ALK PHOS U/L  --  92   BILIRUBIN mg/dL  --  0.6   GLOBULIN gm/dL  --  2.6       Lab Results - Last 18 Months   Lab Units 08/30/22  1303   HEMOGLOBIN g/dL 15.2   HEMATOCRIT % 47.4*   MCV fL 90.8   WBC 10*3/mm3 13.55*   RDW % 13.1   MPV fL 9.6   PLATELETS 10*3/mm3 294             IMPRESSION/PLAN:    Assessment & Plan      Problem List Items Addressed This Visit          Gastrointestinal Abdominal     Diverticulosis - Primary    Overview     Asymptomatic with no further abdominal pain.  Suspected diverticulitis arched 2023    CT of the abdomen and pelvis 3/20/2023 with colonic diverticulosis no acute abdominal process.  Treated for suspected diverticulitis per PCP March 2023 with Augmentin and resolution of pain.    Last colonoscopy 7/26/2019: multiple medium mouthed diverticula left colon, otherwise normal exam. Pt also had a normal colonoscopy 10/2016.   No family history of colon cancer to report.         Current Assessment & Plan     Sinew daily Metamucil therapy.  Avoid all nuts, seeds, foods with adder shell.  Popcorn and Trail Mix have been the trigger for her previous diverticulitis of attacks and she is avoiding these at this time.          Follow up Yearly and call with any suspected signs or symptoms of recurrent diverticulitis events.              Juliane Pickett, APRN  12/14/23  08:43 CST    Part of this note may be an electronic transcription/translation of spoken language to printed text.

## 2024-03-30 ENCOUNTER — HOSPITAL ENCOUNTER (EMERGENCY)
Facility: HOSPITAL | Age: 59
Discharge: HOME | End: 2024-03-31
Attending: STUDENT IN AN ORGANIZED HEALTH CARE EDUCATION/TRAINING PROGRAM | Admitting: STUDENT IN AN ORGANIZED HEALTH CARE EDUCATION/TRAINING PROGRAM
Payer: MEDICARE

## 2024-03-30 DIAGNOSIS — Z86.59 HISTORY OF ANXIETY: ICD-10-CM

## 2024-03-30 DIAGNOSIS — Z98.890 HISTORY OF CARDIAC RADIOFREQUENCY ABLATION: ICD-10-CM

## 2024-03-30 DIAGNOSIS — Z86.79 HISTORY OF SUPRAVENTRICULAR TACHYCARDIA: ICD-10-CM

## 2024-03-30 DIAGNOSIS — R00.2 PALPITATIONS: Primary | ICD-10-CM

## 2024-03-30 DIAGNOSIS — Z86.59 HISTORY OF PANIC ATTACKS: ICD-10-CM

## 2024-03-30 DIAGNOSIS — R07.9 ACUTE CHEST PAIN: ICD-10-CM

## 2024-03-30 PROCEDURE — 83690 ASSAY OF LIPASE: CPT | Performed by: STUDENT IN AN ORGANIZED HEALTH CARE EDUCATION/TRAINING PROGRAM

## 2024-03-30 PROCEDURE — 83880 ASSAY OF NATRIURETIC PEPTIDE: CPT | Performed by: STUDENT IN AN ORGANIZED HEALTH CARE EDUCATION/TRAINING PROGRAM

## 2024-03-30 PROCEDURE — 84484 ASSAY OF TROPONIN QUANT: CPT | Performed by: STUDENT IN AN ORGANIZED HEALTH CARE EDUCATION/TRAINING PROGRAM

## 2024-03-30 PROCEDURE — 99284 EMERGENCY DEPT VISIT MOD MDM: CPT

## 2024-03-30 PROCEDURE — 83735 ASSAY OF MAGNESIUM: CPT | Performed by: STUDENT IN AN ORGANIZED HEALTH CARE EDUCATION/TRAINING PROGRAM

## 2024-03-30 PROCEDURE — 93005 ELECTROCARDIOGRAM TRACING: CPT

## 2024-03-30 PROCEDURE — 85025 COMPLETE CBC W/AUTO DIFF WBC: CPT | Performed by: STUDENT IN AN ORGANIZED HEALTH CARE EDUCATION/TRAINING PROGRAM

## 2024-03-30 PROCEDURE — 93005 ELECTROCARDIOGRAM TRACING: CPT | Performed by: STUDENT IN AN ORGANIZED HEALTH CARE EDUCATION/TRAINING PROGRAM

## 2024-03-30 PROCEDURE — 93010 ELECTROCARDIOGRAM REPORT: CPT | Performed by: HOSPITALIST

## 2024-03-30 PROCEDURE — 80053 COMPREHEN METABOLIC PANEL: CPT | Performed by: STUDENT IN AN ORGANIZED HEALTH CARE EDUCATION/TRAINING PROGRAM

## 2024-03-30 RX ORDER — SODIUM CHLORIDE 0.9 % (FLUSH) 0.9 %
10 SYRINGE (ML) INJECTION AS NEEDED
Status: DISCONTINUED | OUTPATIENT
Start: 2024-03-30 | End: 2024-03-31 | Stop reason: HOSPADM

## 2024-03-31 ENCOUNTER — APPOINTMENT (OUTPATIENT)
Dept: GENERAL RADIOLOGY | Facility: HOSPITAL | Age: 59
End: 2024-03-31
Payer: MEDICARE

## 2024-03-31 VITALS
RESPIRATION RATE: 18 BRPM | SYSTOLIC BLOOD PRESSURE: 136 MMHG | TEMPERATURE: 98 F | HEIGHT: 66 IN | OXYGEN SATURATION: 97 % | DIASTOLIC BLOOD PRESSURE: 84 MMHG | HEART RATE: 57 BPM | BODY MASS INDEX: 27.05 KG/M2

## 2024-03-31 LAB
ALBUMIN SERPL-MCNC: 4.2 G/DL (ref 3.5–5.2)
ALBUMIN/GLOB SERPL: 1.8 G/DL
ALP SERPL-CCNC: 85 U/L (ref 39–117)
ALT SERPL W P-5'-P-CCNC: 13 U/L (ref 1–33)
ANION GAP SERPL CALCULATED.3IONS-SCNC: 10 MMOL/L (ref 5–15)
AST SERPL-CCNC: 15 U/L (ref 1–32)
BASOPHILS # BLD AUTO: 0.05 10*3/MM3 (ref 0–0.2)
BASOPHILS NFR BLD AUTO: 0.6 % (ref 0–1.5)
BILIRUB SERPL-MCNC: 0.2 MG/DL (ref 0–1.2)
BUN SERPL-MCNC: 21 MG/DL (ref 6–20)
BUN/CREAT SERPL: 22.8 (ref 7–25)
CALCIUM SPEC-SCNC: 9.1 MG/DL (ref 8.6–10.5)
CHLORIDE SERPL-SCNC: 104 MMOL/L (ref 98–107)
CO2 SERPL-SCNC: 27 MMOL/L (ref 22–29)
CREAT SERPL-MCNC: 0.92 MG/DL (ref 0.57–1)
DEPRECATED RDW RBC AUTO: 43.2 FL (ref 37–54)
EGFRCR SERPLBLD CKD-EPI 2021: 71.9 ML/MIN/1.73
EOSINOPHIL # BLD AUTO: 0.09 10*3/MM3 (ref 0–0.4)
EOSINOPHIL NFR BLD AUTO: 1 % (ref 0.3–6.2)
ERYTHROCYTE [DISTWIDTH] IN BLOOD BY AUTOMATED COUNT: 12.9 % (ref 12.3–15.4)
GEN 5 2HR TROPONIN T REFLEX: <6 NG/L
GLOBULIN UR ELPH-MCNC: 2.4 GM/DL
GLUCOSE SERPL-MCNC: 148 MG/DL (ref 65–99)
HCT VFR BLD AUTO: 38.2 % (ref 34–46.6)
HGB BLD-MCNC: 12.7 G/DL (ref 12–15.9)
IMM GRANULOCYTES # BLD AUTO: 0.02 10*3/MM3 (ref 0–0.05)
IMM GRANULOCYTES NFR BLD AUTO: 0.2 % (ref 0–0.5)
LIPASE SERPL-CCNC: 42 U/L (ref 13–60)
LYMPHOCYTES # BLD AUTO: 1.85 10*3/MM3 (ref 0.7–3.1)
LYMPHOCYTES NFR BLD AUTO: 20.8 % (ref 19.6–45.3)
MAGNESIUM SERPL-MCNC: 1.8 MG/DL (ref 1.6–2.6)
MCH RBC QN AUTO: 30.4 PG (ref 26.6–33)
MCHC RBC AUTO-ENTMCNC: 33.2 G/DL (ref 31.5–35.7)
MCV RBC AUTO: 91.4 FL (ref 79–97)
MONOCYTES # BLD AUTO: 0.73 10*3/MM3 (ref 0.1–0.9)
MONOCYTES NFR BLD AUTO: 8.2 % (ref 5–12)
NEUTROPHILS NFR BLD AUTO: 6.15 10*3/MM3 (ref 1.7–7)
NEUTROPHILS NFR BLD AUTO: 69.2 % (ref 42.7–76)
NRBC BLD AUTO-RTO: 0 /100 WBC (ref 0–0.2)
NT-PROBNP SERPL-MCNC: 119.9 PG/ML (ref 0–900)
PLATELET # BLD AUTO: 226 10*3/MM3 (ref 140–450)
PMV BLD AUTO: 9.7 FL (ref 6–12)
POTASSIUM SERPL-SCNC: 4.1 MMOL/L (ref 3.5–5.2)
PROT SERPL-MCNC: 6.6 G/DL (ref 6–8.5)
RBC # BLD AUTO: 4.18 10*6/MM3 (ref 3.77–5.28)
SODIUM SERPL-SCNC: 141 MMOL/L (ref 136–145)
TROPONIN T DELTA: NORMAL
TROPONIN T SERPL HS-MCNC: <6 NG/L
WBC NRBC COR # BLD AUTO: 8.89 10*3/MM3 (ref 3.4–10.8)

## 2024-03-31 PROCEDURE — 84484 ASSAY OF TROPONIN QUANT: CPT | Performed by: STUDENT IN AN ORGANIZED HEALTH CARE EDUCATION/TRAINING PROGRAM

## 2024-03-31 PROCEDURE — 36415 COLL VENOUS BLD VENIPUNCTURE: CPT

## 2024-03-31 PROCEDURE — 71045 X-RAY EXAM CHEST 1 VIEW: CPT

## 2024-03-31 NOTE — ED PROVIDER NOTES
"EMERGENCY DEPARTMENT ATTENDING NOTE    Patient Name: Lucero Mock    Chief Complaint   Patient presents with    Palpitations       PATIENT PRESENTATION:  Lucero Mock is a very pleasant 58 y.o. female with history of SVT with prior cardiac ablation as well as a history of anxiety present emergency room due to palpitations and chest pain.    Patient is her symptoms started about 2 hours prior and.  Just prior to arrival.  She states that for like her SVTs failure heart was racing.  States she drank a lot of caffeine today she also has a history of panic attacks did feel somewhat anxious during this.  She describes some sharp chest pain rating to her back that she still feels slightly there although her palpitations are now gone.  Denies any shortness of breath.  No fevers or chills.  No history of coronary artery disease.      PHYSICAL EXAM:   VS: /84 (BP Location: Right arm, Patient Position: Sitting)   Pulse 57   Temp 98 °F (36.7 °C) (Oral)   Resp 18   Ht 167.6 cm (66\")   SpO2 97%   BMI 27.05 kg/m²   GENERAL: Anxious but otherwise well-appearing middle-age woman sitting up stretcher no acute distress; well-nourished, well-developed, awake, alert, no acute distress, nontoxic appearing, comfortable  RESPIRATORY: unlabored respiratory effort, clear to auscultation bilaterally, good air movement  CARDIOVASCULAR: no murmurs, peripheral pulses 2+ and equal in all extremities  GI: soft, nontender, nondistended  MUSCULOSKELETAL/EXTREMITIES: extremities without obvious deformity  SKIN: warm and dry with no obvious rashes  PSYCHIATRIC: Anxious with rapid speech but otherwise alert, pleasant and cooperative. Appropriate mood and affect.      MEDICAL DECISION MAKING:    Lucero Mock is a 58 y.o. female who presented to the ED after an episode of palpitations as not resolved in setting of a prior history of SVT with prior cardiac ablation.    Differential Diagnosis Considered: Cardiac " rhythm including supraventricular tachycardia and atrial fibrillation with right ventricular spots, acute coronary syndrome including STEMI, NSTEMI, and unstable angina, anxiety, panic attack    Labs Ordered:  Labs Reviewed   COMPREHENSIVE METABOLIC PANEL - Abnormal; Notable for the following components:       Result Value    Glucose 148 (*)     BUN 21 (*)     All other components within normal limits    Narrative:     GFR Normal >60  Chronic Kidney Disease <60  Kidney Failure <15     MAGNESIUM - Normal   CBC WITH AUTO DIFFERENTIAL - Normal   TROPONIN - Normal    Narrative:     High Sensitive Troponin T Reference Range:  <14.0 ng/L- Negative Female for AMI  <22.0 ng/L- Negative Male for AMI  >=14 - Abnormal Female indicating possible myocardial injury.  >=22 - Abnormal Male indicating possible myocardial injury.   Clinicians would have to utilize clinical acumen, EKG, Troponin, and serial changes to determine if it is an Acute Myocardial Infarction or myocardial injury due to an underlying chronic condition.        BNP (IN-HOUSE) - Normal    Narrative:     This assay is used as an aid in the diagnosis of individuals suspected of having heart failure. It can be used as an aid in the diagnosis of acute decompensated heart failure (ADHF) in patients presenting with signs and symptoms of ADHF to the emergency department (ED). In addition, NT-proBNP of <300 pg/mL indicates ADHF is not likely.    Age Range Result Interpretation  NT-proBNP Concentration (pg/mL:      <50             Positive            >450                   Gray                 300-450                    Negative             <300    50-75           Positive            >900                  Gray                300-900                  Negative            <300      >75             Positive            >1800                  Gray                300-1800                  Negative            <300   LIPASE - Normal   HIGH SENSITIVITIY TROPONIN T 2HR    Narrative:      High Sensitive Troponin T Reference Range:  <14.0 ng/L- Negative Female for AMI  <22.0 ng/L- Negative Male for AMI  >=14 - Abnormal Female indicating possible myocardial injury.  >=22 - Abnormal Male indicating possible myocardial injury.   Clinicians would have to utilize clinical acumen, EKG, Troponin, and serial changes to determine if it is an Acute Myocardial Infarction or myocardial injury due to an underlying chronic condition.        CBC AND DIFFERENTIAL    Narrative:     The following orders were created for panel order CBC & Differential.  Procedure                               Abnormality         Status                     ---------                               -----------         ------                     CBC Auto Differential[[id number]]        Normal              Final result                 Please view results for these tests on the individual orders.        Imaging Ordered:   XR Chest 1 View   ED Interpretation   No acute findings.      Final Result           No acute abnormality.                                                                               This report was signed and finalized on 3/31/2024 7:55 AM by Dr. Panchito Carrillo MD.              Internal chart review:   Past Medical History:   Diagnosis Date    Anxiety     Arthritis     Diabetes mellitus     Disease of thyroid gland     Diverticulosis     Endometriosis     Esophageal reflux     Heart disease     Hypoglycemia     IBS (irritable bowel syndrome)     Panic disorder     PONV (postoperative nausea and vomiting)     SVT (supraventricular tachycardia)        Past Surgical History:   Procedure Laterality Date    BREAST BIOPSY Bilateral 2005    Benign    BREAST EXCISIONAL BIOPSY Left 2014    Benign    CARDIAC ABLATION  09/2020    COLONOSCOPY N/A 10/28/2016    Diverticulosis in the sigmoid colon; The examination was otherwise normal on direct and retroflexion views; No specimens collected; Repeat 10 years    COLONOSCOPY N/A  07/26/2019    Diverticulosis in the left colon; The examination was otherwise normal on direct and retroflexion views; No specimens collected; Repeat 10 years    DIAGNOSTIC LAPAROSCOPY EXPLORATORY LAPAROTOMY      For endometriosis    HYSTERECTOMY  2012       Allergies   Allergen Reactions    Flagyl [Metronidazole] GI Intolerance    Metformin Dizziness    Latex Rash       No current facility-administered medications for this encounter.    Current Outpatient Medications:     ALPRAZolam (XANAX) 0.5 MG tablet, Take 1 tablet by mouth 2 (Two) Times a Day As Needed for Anxiety., Disp: , Rfl:     amitriptyline (ELAVIL) 25 MG tablet, Take 1 tablet by mouth every night at bedtime., Disp: , Rfl:     Biotin 1 MG capsule, Take 1 tablet by mouth., Disp: , Rfl:     brompheniramine-pseudoephedrine-DM 30-2-10 MG/5ML syrup, Take 10 mL by mouth 4 (Four) Times a Day As Needed for Allergies., Disp: 240 mL, Rfl: 0    Calcium Carbonate-Vit D-Min (CALTRATE 600+D PLUS PO), Take 600 mg by mouth daily., Disp: , Rfl:     cetirizine (zyrTEC) 10 MG tablet, Take 1 tablet by mouth Daily., Disp: , Rfl:     dicyclomine (BENTYL) 20 MG tablet, Take 1 tablet by mouth As Needed., Disp: , Rfl:     fluticasone (FLONASE) 50 MCG/ACT nasal spray, 2 sprays into the nostril(s) as directed by provider Daily., Disp: , Rfl:     furosemide (LASIX) 20 MG tablet, Take 1 tablet by mouth As Needed., Disp: , Rfl:     Januvia 100 MG tablet, Take 1 tablet by mouth Daily., Disp: , Rfl:     levothyroxine (SYNTHROID, LEVOTHROID) 50 MCG tablet, Take 1 tablet by mouth Daily., Disp: , Rfl:     metroNIDAZOLE (METROCREAM) 0.75 % cream, Apply  topically to the appropriate area as directed 2 (Two) Times a Day., Disp: , Rfl:     naproxen (NAPROSYN) 500 MG tablet, Take 1 tablet by mouth As Needed., Disp: , Rfl:     ondansetron ODT (ZOFRAN-ODT) 4 MG disintegrating tablet, Place 1 tablet on the tongue 4 (Four) Times a Day As Needed for Nausea., Disp: 20 tablet, Rfl: 0    ONE TOUCH  ULTRA TEST test strip, USE ONCE DAILY AS NEEDED, Disp: , Rfl: 11    pantoprazole (PROTONIX) 40 MG EC tablet, Take 1 tablet by mouth Daily., Disp: , Rfl: 2    Probiotic Product (PROBIOTIC-10 PO), Take 1 tablet by mouth Daily., Disp: , Rfl:     sertraline (ZOLOFT) 100 MG tablet, Take 1 tablet by mouth Daily., Disp: , Rfl:     My EKG interpretation: Normal sinus rhythm with a rate of 66.  No acute ST elevations ST depressions or T wave inversions.    My lab interpretation: Initial and repeat high-sensitivity troponin are negative.  Normal able to count hemoglobin.  Normal lipase.  Normal BNP.  Normal magnesium.  CMP unremarkable.    My imaging interpretation: Chest x-ray with no acute findings.    Decision tools/scores: HEART score 3.     ED Course and Re-evaluation: 57yo F presenting emergency department with an episode of palpitations that resolved prior to arrival in the setting of prior history of SVT with cardiac ablation.  Patient does have significant anxiety but also use caffeine I discussed with her if she felt her anxiety is contributing if she did not suspect that she could have an episode of SVT particularly exacerbated By caffeine.  Patient was there for 4 hours the emergency permit never had any arrhythmias including SVT.  Her lab work is completely unremarkable she is normal electrolytes.  She had some chest pain following but her heart score is low she is to negative high since troponins and a nonischemic EKG so I have low sufficient that this was in any way due to acute coronary syndrome.  Discussed results with patient's as well as plan for Holter monitor as an outpatient with follow-up with cardiology to which she is presently standing and return precautions given the emerged part for any recurrence of symptoms prickly sensation palpitations as symptoms not resolved that she could have an episode of SVT again.      ED Diagnosis:  (R00.2) Palpitations - Plan: Holter Monitor - 72 Hour Up To 15  Days    (R07.9) Acute chest pain    (Z86.79) History of supraventricular tachycardia - Plan: Holter Monitor - 72 Hour Up To 15 Days    (Z98.890) History of cardiac radiofrequency ablation    (Z86.59) History of anxiety    (Z86.59) History of panic attacks     Disposition: to home  Follow up plan: PCP follow up within 2 days, cardiology within 2 weeks, return to ED immediately if symptoms worsen        Signed:  Darryn Orozco MD  Emergency Medicine Physician    Please note that portions of this note were completed with a voice recognition program.      Darryn Orozco MD  03/31/24 0831

## 2024-03-31 NOTE — DISCHARGE INSTRUCTIONS
Today you are seen for symptoms as discussed during observation and never had any arrhythmia such as SVT.  Caffeine does increase your chances of having SVTs with your history I would try avoid any caffeine use.  I have ordered Holter monitor you will need to go to the cardiology clinic during business hours to pick it up.  I would like to follow-up with our cardiology team in about 2 weeks upon completion of the study.  I also gave follow-up with her primary care provider.  As discussed if your symptoms worsen or recur prickly if you have palpitations that do not resolve I would recommend return emerged part immediately for evaluation.

## 2024-04-01 LAB
QT INTERVAL: 392 MS
QTC INTERVAL: 410 MS

## 2024-05-08 ENCOUNTER — APPOINTMENT (OUTPATIENT)
Dept: GENERAL RADIOLOGY | Facility: HOSPITAL | Age: 59
End: 2024-05-08
Payer: MEDICARE

## 2024-05-08 PROCEDURE — 73090 X-RAY EXAM OF FOREARM: CPT

## 2024-05-09 ENCOUNTER — PATIENT ROUNDING (BHMG ONLY) (OUTPATIENT)
Dept: URGENT CARE | Facility: CLINIC | Age: 59
End: 2024-05-09
Payer: MEDICARE

## 2024-06-05 ENCOUNTER — TRANSCRIBE ORDERS (OUTPATIENT)
Dept: ADMINISTRATIVE | Facility: HOSPITAL | Age: 59
End: 2024-06-05
Payer: MEDICARE

## 2024-06-05 ENCOUNTER — TELEPHONE (OUTPATIENT)
Dept: SURGERY | Age: 59
End: 2024-06-05

## 2024-06-05 DIAGNOSIS — Z12.31 ENCOUNTER FOR SCREENING MAMMOGRAM FOR MALIGNANT NEOPLASM OF BREAST: Primary | ICD-10-CM

## 2024-06-05 NOTE — TELEPHONE ENCOUNTER
Patient ask for mammogram order to be faxed over to [facility]. Patient has follow up appt 7/24.

## 2024-06-22 LAB
NCCN CRITERIA FLAG: NORMAL
TYRER CUZICK SCORE: 9.7

## 2024-07-22 ENCOUNTER — HOSPITAL ENCOUNTER (OUTPATIENT)
Dept: MAMMOGRAPHY | Facility: HOSPITAL | Age: 59
Discharge: HOME | End: 2024-07-22
Admitting: PHYSICIAN ASSISTANT
Payer: MEDICARE

## 2024-07-22 DIAGNOSIS — Z12.31 ENCOUNTER FOR SCREENING MAMMOGRAM FOR MALIGNANT NEOPLASM OF BREAST: ICD-10-CM

## 2024-07-22 PROCEDURE — 77067 SCR MAMMO BI INCL CAD: CPT

## 2024-07-22 PROCEDURE — 77063 BREAST TOMOSYNTHESIS BI: CPT

## 2024-07-24 ENCOUNTER — OFFICE VISIT (OUTPATIENT)
Dept: SURGERY | Age: 59
End: 2024-07-24
Payer: MEDICARE

## 2024-07-24 VITALS
DIASTOLIC BLOOD PRESSURE: 74 MMHG | HEIGHT: 67 IN | BODY MASS INDEX: 24.92 KG/M2 | WEIGHT: 158.8 LBS | TEMPERATURE: 97.6 F | SYSTOLIC BLOOD PRESSURE: 118 MMHG

## 2024-07-24 DIAGNOSIS — Z12.31 VISIT FOR SCREENING MAMMOGRAM: Primary | ICD-10-CM

## 2024-07-24 PROCEDURE — 3017F COLORECTAL CA SCREEN DOC REV: CPT | Performed by: PHYSICIAN ASSISTANT

## 2024-07-24 PROCEDURE — 1036F TOBACCO NON-USER: CPT | Performed by: PHYSICIAN ASSISTANT

## 2024-07-24 PROCEDURE — G8427 DOCREV CUR MEDS BY ELIG CLIN: HCPCS | Performed by: PHYSICIAN ASSISTANT

## 2024-07-24 PROCEDURE — G8419 CALC BMI OUT NRM PARAM NOF/U: HCPCS | Performed by: PHYSICIAN ASSISTANT

## 2024-07-24 PROCEDURE — 99213 OFFICE O/P EST LOW 20 MIN: CPT | Performed by: PHYSICIAN ASSISTANT

## 2024-07-24 NOTE — PROGRESS NOTES
Merissa Dubon comes today for her follow-up breast exam.  She has had no new breast complaints.  She reports no new palpable masses.  There is no skin or nipple changes.  There is no nipple discharge.  She has no appreciable evidence of supraclavicular or axillary adenopathy.    Patient Active Problem List    Diagnosis Date Noted    AVNRT (AV renzo re-entry tachycardia) (HCC) 09/07/2016    Hypothyroidism 09/07/2016    Lobular carcinoma in situ 07/21/2014    Atypical lobular hyperplasia of breast 06/17/2014    Abnormal mammogram 05/23/2014    Mammographic microcalcification 05/23/2014       Current Outpatient Medications   Medication Sig Dispense Refill    cetirizine (ZYRTEC) 10 MG tablet Take 1 tablet by mouth daily      fluticasone (FLONASE) 50 MCG/ACT nasal spray USE 1 SPRAY IN EACH NOSTRIL ONCE DAILY AS NEEDED. **60 DAY SUPPLY**      naproxen (NAPROSYN) 500 MG tablet Take 1 tablet by mouth 2 times daily (with meals)      dicyclomine (BENTYL) 20 MG tablet       pantoprazole (PROTONIX) 20 MG tablet Take 1 tablet by mouth daily      sertraline (ZOLOFT) 50 MG tablet Take 1 tablet by mouth daily      ALPRAZolam (XANAX) 0.5 MG tablet nightly as needed       levothyroxine (SYNTHROID) 50 MCG tablet   11    calcium carbonate (OSCAL) 500 MG TABS tablet Take 1 tablet by mouth daily      Biotin 1 MG CAPS Take  by mouth.      clotrimazole-betamethasone (LOTRISONE) 1-0.05 % cream Apply topically 2 times daily. (Patient not taking: Reported on 7/24/2024) 45 g 5    alogliptin (NESINA) 25 MG TABS tablet  (Patient not taking: Reported on 7/24/2024)      metoprolol tartrate (LOPRESSOR) 25 MG tablet Take 1.5 tablets by mouth 2 times daily (Patient not taking: Reported on 7/14/2022) 90 tablet 5    Loratadine (CLARITIN) 10 MG CAPS Take 10 mg by mouth daily (Patient not taking: Reported on 7/14/2022)      aspirin 81 MG tablet Take 81 mg by mouth daily. (Patient not taking: Reported on 7/24/2024)

## 2024-10-26 NOTE — PROGRESS NOTES
[facility]thopaedic Clinic Note - Established Patient    NAME:  Merissa Dubon   : 1965  MRN: [id number]    10/28/2024    CHIEF COMPLAINT:  follow up left knee pain, repeat injection    HISTORY OF PRESENT ILLNESS:   The patient is a 59 y.o. female who returns today for follow up of left knee pain, requesting repeat injection. It has been 3 months since last injection. Patient denies any recent trauma, fall, or other other injury. Pain is rated 7/10 today.    Past Medical History:        Diagnosis Date    Diverticulitis     ER visit 17    Endometriosis     Hyperthyroidism     Hypoglycemia     Osteoarthritis     Panic attack        Past Surgical History:        Procedure Laterality Date    BREAST BIOPSY Left 2014    BREAST SURGERY Right 2005    benign    CARDIAC SURGERY  2020    [facility]    HYSTERECTOMY (CERVIX STATUS UNKNOWN)  2012       Current Medications:   Prior to Admission medications    Medication Sig Start Date End Date Taking? Authorizing Provider   PROBIOTIC PRODUCT PO Take 1 tablet by mouth daily   Yes Emmanuel Caal MD   meclizine (ANTIVERT) 12.5 MG tablet TAKE ONE TABLET BY MOUTH THREE TIMES A DAY AS NEEDED FOR VERTIGO   Yes Emmanuel Caal MD   metroNIDAZOLE (METROCREAM) 0.75 % cream Apply topically 2 times daily   Yes Emmanuel Caal MD   mupirocin (BACTROBAN) 2 % ointment APPLY 1 APPLICATION TOPICALLY TO THE APPROPRIATE AREA AS DIRECTED 3 (THREE) TIMES A DAY.   Yes Emmanuel Caal MD   ondansetron (ZOFRAN) 4 MG tablet Take 1 tablet by mouth every 8 hours as needed   Yes Emmanuel Caal MD   Psyllium (METAMUCIL) 0.36 g CAPS Take by mouth   Yes Emmanuel Caal MD   JANUVIA 100 MG tablet Take 1 tablet by mouth daily   Yes Emmanuel Caal MD   sertraline (ZOLOFT) 100 MG tablet Take 1 tablet by mouth daily 10/4/24  Yes Emmanuel Caal MD   cetirizine (ZYRTEC) 10 MG tablet Take 1 tablet by mouth daily   Yes

## 2024-10-28 ENCOUNTER — OFFICE VISIT (OUTPATIENT)
Age: 59
End: 2024-10-28
Payer: MEDICARE

## 2024-10-28 VITALS — BODY MASS INDEX: 25.43 KG/M2 | WEIGHT: 162 LBS | HEIGHT: 67 IN

## 2024-10-28 DIAGNOSIS — M17.12 PRIMARY OSTEOARTHRITIS OF LEFT KNEE: Primary | ICD-10-CM

## 2024-10-28 PROCEDURE — 20610 DRAIN/INJ JOINT/BURSA W/O US: CPT

## 2024-10-28 RX ORDER — SITAGLIPTIN 100 MG/1
100 TABLET, FILM COATED ORAL DAILY
COMMUNITY

## 2024-10-28 RX ORDER — LIDOCAINE HYDROCHLORIDE 10 MG/ML
2 INJECTION, SOLUTION INFILTRATION; PERINEURAL ONCE
Status: COMPLETED | OUTPATIENT
Start: 2024-10-28 | End: 2024-10-28

## 2024-10-28 RX ORDER — ONDANSETRON 4 MG/1
4 TABLET, FILM COATED ORAL EVERY 8 HOURS PRN
COMMUNITY

## 2024-10-28 RX ORDER — BUPIVACAINE HYDROCHLORIDE 2.5 MG/ML
2 INJECTION, SOLUTION INFILTRATION; PERINEURAL ONCE
Status: COMPLETED | OUTPATIENT
Start: 2024-10-28 | End: 2024-10-28

## 2024-10-28 RX ORDER — MECLIZINE HCL 12.5 MG 12.5 MG/1
TABLET ORAL
COMMUNITY

## 2024-10-28 RX ORDER — MUPIROCIN 20 MG/G
OINTMENT TOPICAL
COMMUNITY

## 2024-10-28 RX ORDER — SERTRALINE HYDROCHLORIDE 100 MG/1
100 TABLET, FILM COATED ORAL DAILY
COMMUNITY
Start: 2024-10-04

## 2024-10-28 RX ORDER — PSYLLIUM HUSK 0.4 G
CAPSULE ORAL
COMMUNITY

## 2024-10-28 RX ORDER — TRIAMCINOLONE ACETONIDE 40 MG/ML
40 INJECTION, SUSPENSION INTRA-ARTICULAR; INTRAMUSCULAR ONCE
Status: COMPLETED | OUTPATIENT
Start: 2024-10-28 | End: 2024-10-28

## 2024-10-28 RX ADMIN — LIDOCAINE HYDROCHLORIDE 2 ML: 10 INJECTION, SOLUTION INFILTRATION; PERINEURAL at 09:01

## 2024-10-28 RX ADMIN — TRIAMCINOLONE ACETONIDE 40 MG: 40 INJECTION, SUSPENSION INTRA-ARTICULAR; INTRAMUSCULAR at 09:02

## 2024-10-28 RX ADMIN — BUPIVACAINE HYDROCHLORIDE 5 MG: 2.5 INJECTION, SOLUTION INFILTRATION; PERINEURAL at 09:01

## 2024-11-18 ENCOUNTER — OFFICE VISIT (OUTPATIENT)
Age: 59
End: 2024-11-18
Payer: MEDICARE

## 2024-11-18 VITALS
HEIGHT: 66 IN | BODY MASS INDEX: 25.6 KG/M2 | SYSTOLIC BLOOD PRESSURE: 118 MMHG | DIASTOLIC BLOOD PRESSURE: 70 MMHG | WEIGHT: 159.3 LBS

## 2024-11-18 DIAGNOSIS — Z12.31 SCREENING MAMMOGRAM FOR BREAST CANCER: ICD-10-CM

## 2024-11-18 DIAGNOSIS — Z01.419 WELL WOMAN EXAM WITH ROUTINE GYNECOLOGICAL EXAM: Primary | ICD-10-CM

## 2024-11-18 DIAGNOSIS — Z78.9 NON-SMOKER: ICD-10-CM

## 2024-11-18 PROCEDURE — 99386 PREV VISIT NEW AGE 40-64: CPT | Performed by: OBSTETRICS & GYNECOLOGY

## 2024-11-18 PROCEDURE — 2014F MENTAL STATUS ASSESS: CPT | Performed by: OBSTETRICS & GYNECOLOGY

## 2024-11-18 NOTE — PROGRESS NOTES
"Konrad Mock is a 59 y.o. female here for annual exam. Pt doing well. Pt with no complaints. Prior hysterectomy due to uterine prolapse. Pt denies pelvic pressure. Denies urinary symptoms.  Pt with mammogram up to date.  Colonoscopy up to date. Pt denies dyspareunia, vaginal dryness, vaginal discharge. Denies menopausal symptoms.     Gynecologic Exam  The patient's pertinent negatives include no pelvic pain or vaginal discharge. Pertinent negatives include no dysuria.         /70   Ht 168.9 cm (66.5\")   Wt 72.3 kg (159 lb 4.8 oz)   BMI 25.33 kg/m²     Outpatient Encounter Medications as of 11/18/2024   Medication Sig Dispense Refill    ALPRAZolam (XANAX) 0.5 MG tablet Take 1 tablet by mouth 2 (Two) Times a Day As Needed for Anxiety.      Biotin 1 MG capsule Take 1 tablet by mouth.      Calcium Carbonate-Vit D-Min (CALTRATE 600+D PLUS PO) Take 600 mg by mouth daily.      cetirizine (zyrTEC) 10 MG tablet Take 1 tablet by mouth Daily.      dicyclomine (BENTYL) 20 MG tablet Take 1 tablet by mouth As Needed.      fluticasone (FLONASE) 50 MCG/ACT nasal spray Administer 2 sprays into the nostril(s) as directed by provider Daily.      Januvia 100 MG tablet Take 1 tablet by mouth Daily.      levothyroxine (SYNTHROID, LEVOTHROID) 50 MCG tablet Take 1 tablet by mouth Daily.      metroNIDAZOLE (METROCREAM) 0.75 % cream Apply  topically to the appropriate area as directed 2 (Two) Times a Day.      mupirocin (BACTROBAN) 2 % ointment Apply 1 Application topically to the appropriate area as directed 3 (Three) Times a Day. 22 g 0    naproxen (NAPROSYN) 500 MG tablet Take 1 tablet by mouth As Needed.      ondansetron ODT (ZOFRAN-ODT) 4 MG disintegrating tablet Place 1 tablet on the tongue 4 (Four) Times a Day As Needed for Nausea. 20 tablet 0    ONE TOUCH ULTRA TEST test strip USE ONCE DAILY AS NEEDED  11    pantoprazole (PROTONIX) 40 MG EC tablet Take 1 tablet by mouth Daily.  2    Probiotic Product " (PROBIOTIC-10 PO) Take 1 tablet by mouth Daily.      [DISCONTINUED] amitriptyline (ELAVIL) 25 MG tablet Take 1 tablet by mouth every night at bedtime.      [DISCONTINUED] furosemide (LASIX) 20 MG tablet Take 1 tablet by mouth As Needed.      [DISCONTINUED] meclizine (ANTIVERT) 12.5 MG tablet TAKE ONE TABLET BY MOUTH THREE TIMES A DAY AS NEEDED FOR VERTIGO      [DISCONTINUED] sertraline (ZOLOFT) 100 MG tablet Take 1 tablet by mouth Daily.       No facility-administered encounter medications on file as of 11/18/2024.       Surgical History  Past Surgical History:   Procedure Laterality Date    BREAST BIOPSY Bilateral 2005    Benign    BREAST EXCISIONAL BIOPSY Left 2014    Benign    CARDIAC ABLATION  09/2020    COLONOSCOPY N/A 10/28/2016    Diverticulosis in the sigmoid colon; The examination was otherwise normal on direct and retroflexion views; No specimens collected; Repeat 10 years    COLONOSCOPY N/A 07/26/2019    Diverticulosis in the left colon; The examination was otherwise normal on direct and retroflexion views; No specimens collected; Repeat 10 years    DIAGNOSTIC LAPAROSCOPY EXPLORATORY LAPAROTOMY      For endometriosis    HYSTERECTOMY  2012       Family History  Family History   Problem Relation Age of Onset    Cancer Father     Crohn's disease Mother     Irritable bowel syndrome Mother     Breast cancer Maternal Aunt     Melanoma Paternal Uncle     Breast cancer Maternal Cousin     Celiac disease Neg Hx     Cirrhosis Neg Hx     Colon cancer Neg Hx     Esophageal cancer Neg Hx     Liver cancer Neg Hx     Liver disease Neg Hx     Rectal cancer Neg Hx     Stomach cancer Neg Hx     Colon polyps Neg Hx     Ovarian cancer Neg Hx     Uterine cancer Neg Hx        The following portions of the patient's history were reviewed and updated as appropriate: allergies, current medications, past family history, past medical history, past social history, past surgical history, and problem list.    Review of Systems    Genitourinary:  Negative for breast discharge, breast lump, breast pain, dyspareunia, dysuria, menstrual problem, pelvic pain, pelvic pressure, urinary incontinence, vaginal bleeding, vaginal discharge and vaginal pain.       Objective   Physical Exam  Exam conducted with a chaperone present.   Constitutional:       Appearance: Normal appearance.   Chest:   Breasts:     Right: Normal. No inverted nipple, mass, nipple discharge or skin change.      Left: Normal. No inverted nipple, mass, nipple discharge or skin change.   Abdominal:      General: Abdomen is flat. Bowel sounds are normal.      Palpations: Abdomen is soft.   Genitourinary:     Comments: Normal external genitalia, vaginal mucosa pink without lesions, cervix absent, vaginal cuff well suspended, no prolapse, bimanual exam no masses, nontender, no vaginal discharge  Neurological:      Mental Status: She is alert.   Psychiatric:         Mood and Affect: Mood normal.         Behavior: Behavior normal.         Assessment & Plan   Diagnoses and all orders for this visit:    1. Well woman exam with routine gynecological exam (Primary)  60 y/o CF here for well woman exam, doing well. Normal breast and pelvic exam. Mammogram ordered. No pap smear per ASCCP guidelines. RTC yearly.    2. Screening mammogram for breast cancer  -     Mammo Screening Digital Tomosynthesis Bilateral With CAD; Future    3. Non-smoker    BMI Body mass index is 25.33 kg/m²..   Colonoscopy: Up to date  Mammogram: Ordered today  DEXA:  at age 64  Pap smear, per ASCCP guidelines, not applicable  STI screening: declines  Contraception: hyst    AVS/Patient education handout on the following as well w/discussion  Encouraged self breast awareness.  Encouraged proactive weight management and importance of maintaining a healthy weight.   Encouraged regular exercise and the importance of same, in regards to a healthy heart as well as helping to maintain her weight and improving her mental health.           BMI is >= 25 and <30. (Overweight) The following options were offered after discussion;: exercise counseling/recommendations and nutrition counseling/recommendations      Marisabel Hunter MD  11/18/2024

## 2024-11-26 ENCOUNTER — TELEPHONE (OUTPATIENT)
Dept: OBSTETRICS AND GYNECOLOGY | Age: 59
End: 2024-11-26
Payer: MEDICARE

## 2024-11-26 PROCEDURE — 87086 URINE CULTURE/COLONY COUNT: CPT | Performed by: NURSE PRACTITIONER

## 2024-11-26 NOTE — TELEPHONE ENCOUNTER
Pt called with c/o urinary frequency and discomfort.  Pt seen in office for annual 11/18/24.  Pt denied symptoms at that appt.  Pt advised to call her PCP or go to urgent care for her c/o urinary discomfort.  Pt voiced understanding.

## 2024-12-03 ENCOUNTER — OFFICE VISIT (OUTPATIENT)
Dept: GASTROENTEROLOGY | Facility: CLINIC | Age: 59
End: 2024-12-03
Payer: MEDICARE

## 2024-12-03 VITALS
DIASTOLIC BLOOD PRESSURE: 70 MMHG | SYSTOLIC BLOOD PRESSURE: 110 MMHG | HEIGHT: 66 IN | OXYGEN SATURATION: 95 % | BODY MASS INDEX: 25.39 KG/M2 | WEIGHT: 158 LBS | HEART RATE: 72 BPM | TEMPERATURE: 97.3 F

## 2024-12-03 DIAGNOSIS — K57.90 DIVERTICULOSIS: Primary | ICD-10-CM

## 2024-12-03 PROCEDURE — 99213 OFFICE O/P EST LOW 20 MIN: CPT | Performed by: NURSE PRACTITIONER

## 2024-12-03 PROCEDURE — 1160F RVW MEDS BY RX/DR IN RCRD: CPT | Performed by: NURSE PRACTITIONER

## 2024-12-03 PROCEDURE — 1159F MED LIST DOCD IN RCRD: CPT | Performed by: NURSE PRACTITIONER

## 2024-12-03 RX ORDER — PYRITHIONE ZINC 1 G/ML
1 LOTION/SHAMPOO TOPICAL DAILY
COMMUNITY

## 2024-12-03 NOTE — ASSESSMENT & PLAN NOTE
With no family history of colon cancer and no personal history of adenomatous colon polyps her recall colonoscopy will be set for July 2029.  Unless she has issues or problems in the interim no need to repeat colonoscopy.  I did tell her if her family history were to change she is to notify us as well.  She is asking whether or not she needs a Cologuard study.  I have let her know there is no need to do that since she is up-to-date on her colonoscopy.

## 2024-12-03 NOTE — PROGRESS NOTES
Primary Physician: Navdeep Armenta Jr., MD    Chief Complaint   Patient presents with    Follow-up     Pt presents today for yearly OV for diverticulosis and hx of diverticulitis-states she is feeling good and hasn't had any diverticulitis flares since last seen        Subjective     Lucero Mock is a 59 y.o. female.    HPI  Diverticulosis  Patient here for a yearly office follow-up.  She has a history of diverticulosis with previous attacks of diverticulitis.  March 2023 patient had suspected diverticulitis flare treated with Augmentin for 10 days.  This was the most recent attack of diverticulitis to mention.    Last colonoscopy 7/26/2019: multiple medium mouthed diverticula left colon, otherwise normal exam. Pt also had a normal colonoscopy 10/2016.   There is no family history of colon cancer to report.    She does take Metamucil therapy on a daily basis.    Pt has a recent UTI treated successfully with Macrobid. Today she is pain free.   Her bowels are moving well. No current issues with diarrhea or constipation.    IBS  Patient does use Bentyl for IBS symptoms.  Uses this rarely.  Overall all IBS symptoms are under control.      Past Medical History:   Diagnosis Date    Anxiety     Arthritis     Diabetes mellitus     Disease of thyroid gland     Diverticulosis     Endometriosis     Esophageal reflux     Heart disease     Hypoglycemia     IBS (irritable bowel syndrome)     Panic disorder     PONV (postoperative nausea and vomiting)     SVT (supraventricular tachycardia)        Past Surgical History:   Procedure Laterality Date    BREAST BIOPSY Bilateral 2005    Benign    BREAST EXCISIONAL BIOPSY Left 2014    Benign    CARDIAC ABLATION  09/2020    COLONOSCOPY N/A 10/28/2016    Diverticulosis in the sigmoid colon; The examination was otherwise normal on direct and retroflexion views; No specimens collected; Repeat 10 years    COLONOSCOPY N/A 07/26/2019    Diverticulosis in the left colon; The  examination was otherwise normal on direct and retroflexion views; No specimens collected; Repeat 10 years    DIAGNOSTIC LAPAROSCOPY EXPLORATORY LAPAROTOMY      For endometriosis    HYSTERECTOMY  2012        Current Outpatient Medications:     ALPRAZolam (XANAX) 0.5 MG tablet, Take 1 tablet by mouth 2 (Two) Times a Day As Needed for Anxiety., Disp: , Rfl:     Biotin 1 MG capsule, Take 1 tablet by mouth Daily., Disp: , Rfl:     Calcium Carbonate-Vit D-Min (CALTRATE 600+D PLUS PO), Take 600 mg by mouth daily., Disp: , Rfl:     cetirizine (zyrTEC) 10 MG tablet, Take 1 tablet by mouth Daily., Disp: , Rfl:     dicyclomine (BENTYL) 20 MG tablet, Take 1 tablet by mouth As Needed., Disp: , Rfl:     fluticasone (FLONASE) 50 MCG/ACT nasal spray, Administer 2 sprays into the nostril(s) as directed by provider Daily., Disp: , Rfl:     Januvia 100 MG tablet, Take 1 tablet by mouth Daily., Disp: , Rfl:     levothyroxine (SYNTHROID, LEVOTHROID) 50 MCG tablet, Take 1 tablet by mouth Daily., Disp: , Rfl:     Metamucil Fiber chewable tablet, Chew 1 tablet Daily., Disp: , Rfl:     metroNIDAZOLE (METROCREAM) 0.75 % cream, Apply  topically to the appropriate area as directed 2 (Two) Times a Day., Disp: , Rfl:     mupirocin (BACTROBAN) 2 % ointment, Apply 1 Application topically to the appropriate area as directed 3 (Three) Times a Day., Disp: 22 g, Rfl: 0    naproxen (NAPROSYN) 500 MG tablet, Take 1 tablet by mouth As Needed., Disp: , Rfl:     ondansetron ODT (ZOFRAN-ODT) 4 MG disintegrating tablet, Place 1 tablet on the tongue 4 (Four) Times a Day As Needed for Nausea., Disp: 20 tablet, Rfl: 0    ONE TOUCH ULTRA TEST test strip, USE ONCE DAILY AS NEEDED, Disp: , Rfl: 11    pantoprazole (PROTONIX) 40 MG EC tablet, Take 1 tablet by mouth Daily., Disp: , Rfl: 2    Probiotic Product (PROBIOTIC-10 PO), Take 1 tablet by mouth Daily., Disp: , Rfl:     Allergies   Allergen Reactions    Flagyl [Metronidazole] GI Intolerance    Metformin  "Dizziness    Latex Rash       Social History     Socioeconomic History    Marital status: Single   Tobacco Use    Smoking status: Never    Smokeless tobacco: Never   Vaping Use    Vaping status: Never Used   Substance and Sexual Activity    Alcohol use: Not Currently    Drug use: No    Sexual activity: Yes     Partners: Male       Family History   Problem Relation Age of Onset    Cancer Father     Crohn's disease Mother     Irritable bowel syndrome Mother     Breast cancer Maternal Aunt     Melanoma Paternal Uncle     Breast cancer Maternal Cousin     Celiac disease Neg Hx     Cirrhosis Neg Hx     Colon cancer Neg Hx     Esophageal cancer Neg Hx     Liver cancer Neg Hx     Liver disease Neg Hx     Rectal cancer Neg Hx     Stomach cancer Neg Hx     Colon polyps Neg Hx     Ovarian cancer Neg Hx     Uterine cancer Neg Hx        Review of Systems   Constitutional:  Negative for unexpected weight change.   Gastrointestinal:  Negative for blood in stool, constipation and diarrhea.       Objective     /70 (BP Location: Left arm, Patient Position: Sitting, Cuff Size: Adult)   Pulse 72   Temp 97.3 °F (36.3 °C) (Infrared)   Ht 167.6 cm (66\")   Wt 71.7 kg (158 lb)   SpO2 95%   Breastfeeding No   BMI 25.50 kg/m²     Physical Exam  Vitals reviewed.   Constitutional:       Appearance: Normal appearance.   Neurological:      Mental Status: She is alert.         Lab Results - Last 18 Months   Lab Units 03/30/24  2359   GLUCOSE mg/dL 148*   BUN mg/dL 21*   CREATININE mg/dL 0.92   SODIUM mmol/L 141   POTASSIUM mmol/L 4.1   CHLORIDE mmol/L 104   CO2 mmol/L 27.0   TOTAL PROTEIN g/dL 6.6   ALBUMIN g/dL 4.2   ALT (SGPT) U/L 13   AST (SGOT) U/L 15   ALK PHOS U/L 85   BILIRUBIN mg/dL 0.2   GLOBULIN gm/dL 2.4       Lab Results - Last 18 Months   Lab Units 03/30/24  2359   HEMOGLOBIN g/dL 12.7   HEMATOCRIT % 38.2   MCV fL 91.4   WBC 10*3/mm3 8.89   RDW % 12.9   MPV fL 9.7   PLATELETS 10*3/mm3 226 "             IMPRESSION/PLAN:    Assessment & Plan      Problem List Items Addressed This Visit       Diverticulosis - Primary    Overview     Asymptomatic with no further abdominal pain.  Last suspected diverticulitis flare March 2023    CT of the abdomen and pelvis 3/20/2023 with colonic diverticulosis no acute abdominal process.  Treated for suspected diverticulitis per PCP March 2023 with Augmentin and resolution of pain.    Last colonoscopy 7/26/2019: multiple medium mouthed diverticula left colon, otherwise normal exam. Pt also had a normal colonoscopy 10/2016.   No family history of colon cancer to report.         Current Assessment & Plan     Recall colonoscopy July 2029          Follow up yearly  Pt recall colonoscopy will be planned for July 2029  Pt to call us with any abdominal pain or bowel changes.              Juliane Pickett, APRN  12/03/24  10:24 CST    Part of this note may be an electronic transcription/translation of spoken language to printed text.

## 2025-01-22 NOTE — PROGRESS NOTES
Orthopaedic Clinic Note - Established Patient    NAME:  Merissa Dubon   : 1965  MRN: [id number]    2025    CHIEF COMPLAINT:  follow up left knee pain, repeat injection    HISTORY OF PRESENT ILLNESS:   The patient is a 59 y.o. female who returns today for follow up of left knee pain, requesting repeat injection. It has been 3 months since last injection. Patient denies any recent trauma, fall, or other other injury.     Past Medical History:        Diagnosis Date    Diverticulitis     ER visit 18    Endometriosis     Hyperthyroidism     Hypoglycemia     Osteoarthritis     Panic attack        Past Surgical History:        Procedure Laterality Date    BREAST BIOPSY Left 2014    BREAST SURGERY Right 2005    benign    CARDIAC SURGERY  2020    [facility]    HYSTERECTOMY (CERVIX STATUS UNKNOWN)  2012       Current Medications:   Prior to Admission medications    Medication Sig Start Date End Date Taking? Authorizing Provider   PROBIOTIC PRODUCT PO Take 1 tablet by mouth daily    Emmanuel Caal MD   meclizine (ANTIVERT) 12.5 MG tablet TAKE ONE TABLET BY MOUTH THREE TIMES A DAY AS NEEDED FOR VERTIGO    Emmanuel Caal MD   metroNIDAZOLE (METROCREAM) 0.75 % cream Apply topically 2 times daily    Emmanuel Caal MD   mupirocin (BACTROBAN) 2 % ointment APPLY 1 APPLICATION TOPICALLY TO THE APPROPRIATE AREA AS DIRECTED 3 (THREE) TIMES A DAY.    Emmanuel Caal MD   ondansetron (ZOFRAN) 4 MG tablet Take 1 tablet by mouth every 8 hours as needed    Emmanuel Caal MD   Psyllium (METAMUCIL) 0.36 g CAPS Take by mouth    Emmanuel Caal MD   JANUVIA 100 MG tablet Take 1 tablet by mouth daily    Emmanuel Caal MD   sertraline (ZOLOFT) 100 MG tablet Take 1 tablet by mouth daily 10/4/24   Emmanuel Caal MD   clotrimazole-betamethasone (LOTRISONE) 1-0.05 % cream Apply topically 2 times daily.  Patient not taking: Reported on

## 2025-01-29 ENCOUNTER — OFFICE VISIT (OUTPATIENT)
Age: 60
End: 2025-01-29
Payer: MEDICARE

## 2025-01-29 VITALS — WEIGHT: 165 LBS | HEIGHT: 67 IN | BODY MASS INDEX: 25.9 KG/M2

## 2025-01-29 DIAGNOSIS — M17.12 PRIMARY OSTEOARTHRITIS OF LEFT KNEE: Primary | ICD-10-CM

## 2025-01-29 PROCEDURE — 20610 DRAIN/INJ JOINT/BURSA W/O US: CPT

## 2025-01-29 RX ORDER — TRIAMCINOLONE ACETONIDE 40 MG/ML
40 INJECTION, SUSPENSION INTRA-ARTICULAR; INTRAMUSCULAR ONCE
Status: COMPLETED | OUTPATIENT
Start: 2025-01-29 | End: 2025-01-29

## 2025-01-29 RX ORDER — BUPIVACAINE HYDROCHLORIDE 2.5 MG/ML
2 INJECTION, SOLUTION INFILTRATION; PERINEURAL ONCE
Status: COMPLETED | OUTPATIENT
Start: 2025-01-29 | End: 2025-01-29

## 2025-01-29 RX ORDER — LIDOCAINE HYDROCHLORIDE 10 MG/ML
2 INJECTION, SOLUTION INFILTRATION; PERINEURAL ONCE
Status: COMPLETED | OUTPATIENT
Start: 2025-01-29 | End: 2025-01-29

## 2025-01-29 RX ADMIN — TRIAMCINOLONE ACETONIDE 40 MG: 40 INJECTION, SUSPENSION INTRA-ARTICULAR; INTRAMUSCULAR at 09:04

## 2025-01-29 RX ADMIN — LIDOCAINE HYDROCHLORIDE 2 ML: 10 INJECTION, SOLUTION INFILTRATION; PERINEURAL at 09:03

## 2025-01-29 RX ADMIN — BUPIVACAINE HYDROCHLORIDE 5 MG: 2.5 INJECTION, SOLUTION INFILTRATION; PERINEURAL at 09:03

## 2025-04-23 NOTE — PROGRESS NOTES
Orthopaedic Clinic Note - Established Patient    NAME:  Merissa Dubon   : 1965  MRN: [id number]    2025    CHIEF COMPLAINT:  follow up left knee pain, repeat injection    HISTORY OF PRESENT ILLNESS:   The patient is a 59 y.o. female who returns today for follow up of left knee pain, requesting repeat injection. It has been 3 months since last injection. Patient denies any recent trauma, fall, or other other injury. Pain is rated a 6/10.    Past Medical History:        Diagnosis Date    Diverticulitis     ER visit 18    Endometriosis     Hyperthyroidism     Hypoglycemia     Osteoarthritis     Panic attack        Past Surgical History:        Procedure Laterality Date    BREAST BIOPSY Left 2014    BREAST SURGERY Right 2005    benign    CARDIAC SURGERY  2020    [facility]    HYSTERECTOMY (CERVIX STATUS UNKNOWN)  2012       Current Medications:   Prior to Admission medications    Medication Sig Start Date End Date Taking? Authorizing Provider   rosuvastatin (CRESTOR) 10 MG tablet TAKE 1 ONCE A DAY IN THE EVENING FOR CHOLESTEROL 25  Yes Emmanuel Caal MD   PROBIOTIC PRODUCT PO Take 1 tablet by mouth daily   Yes Emmanuel Caal MD   meclizine (ANTIVERT) 12.5 MG tablet TAKE ONE TABLET BY MOUTH THREE TIMES A DAY AS NEEDED FOR VERTIGO   Yes Emmanuel Caal MD   metroNIDAZOLE (METROCREAM) 0.75 % cream Apply topically 2 times daily   Yes Emmanuel aCal MD   mupirocin (BACTROBAN) 2 % ointment APPLY 1 APPLICATION TOPICALLY TO THE APPROPRIATE AREA AS DIRECTED 3 (THREE) TIMES A DAY.   Yes Emmanuel Caal MD   ondansetron (ZOFRAN) 4 MG tablet Take 1 tablet by mouth every 8 hours as needed   Yes Emmanuel Caal MD   Psyllium (METAMUCIL) 0.36 g CAPS Take by mouth   Yes Emmanuel Caal MD   JANUVIA 100 MG tablet Take 1 tablet by mouth daily   Yes Emmanuel Caal MD   sertraline (ZOLOFT) 100 MG tablet Take 1 tablet by

## 2025-04-30 ENCOUNTER — OFFICE VISIT (OUTPATIENT)
Age: 60
End: 2025-04-30
Payer: MEDICARE

## 2025-04-30 VITALS — WEIGHT: 163 LBS | HEIGHT: 67 IN | BODY MASS INDEX: 25.58 KG/M2

## 2025-04-30 DIAGNOSIS — M17.12 PRIMARY OSTEOARTHRITIS OF LEFT KNEE: Primary | ICD-10-CM

## 2025-04-30 PROCEDURE — 20610 DRAIN/INJ JOINT/BURSA W/O US: CPT

## 2025-04-30 RX ORDER — LIDOCAINE HYDROCHLORIDE 10 MG/ML
2 INJECTION, SOLUTION INFILTRATION; PERINEURAL ONCE
Status: COMPLETED | OUTPATIENT
Start: 2025-04-30 | End: 2025-04-30

## 2025-04-30 RX ORDER — ROSUVASTATIN CALCIUM 10 MG/1
TABLET, COATED ORAL
COMMUNITY
Start: 2025-04-25

## 2025-04-30 RX ORDER — BUPIVACAINE HYDROCHLORIDE 2.5 MG/ML
2 INJECTION, SOLUTION INFILTRATION; PERINEURAL ONCE
Status: COMPLETED | OUTPATIENT
Start: 2025-04-30 | End: 2025-04-30

## 2025-04-30 RX ORDER — TRIAMCINOLONE ACETONIDE 40 MG/ML
40 INJECTION, SUSPENSION INTRA-ARTICULAR; INTRAMUSCULAR ONCE
Status: COMPLETED | OUTPATIENT
Start: 2025-04-30 | End: 2025-04-30

## 2025-04-30 RX ADMIN — LIDOCAINE HYDROCHLORIDE 2 ML: 10 INJECTION, SOLUTION INFILTRATION; PERINEURAL at 09:03

## 2025-04-30 RX ADMIN — TRIAMCINOLONE ACETONIDE 40 MG: 40 INJECTION, SUSPENSION INTRA-ARTICULAR; INTRAMUSCULAR at 09:03

## 2025-04-30 RX ADMIN — BUPIVACAINE HYDROCHLORIDE 5 MG: 2.5 INJECTION, SOLUTION INFILTRATION; PERINEURAL at 09:03

## 2025-05-30 ENCOUNTER — TELEPHONE (OUTPATIENT)
Dept: SURGERY | Age: 60
End: 2025-05-30

## 2025-05-30 NOTE — TELEPHONE ENCOUNTER
Merissa Dubon called  requesting to have mammogram order sent to [facility] . Please call pt advise.

## 2025-07-08 LAB
NCCN CRITERIA FLAG: NORMAL
TYRER CUZICK SCORE: 9.4

## 2025-07-09 PROBLEM — R63.5 WEIGHT GAIN: Status: ACTIVE | Noted: 2020-06-03

## 2025-07-09 PROBLEM — I47.10 SVT (SUPRAVENTRICULAR TACHYCARDIA): Status: ACTIVE | Noted: 2020-06-03

## 2025-07-09 PROBLEM — R73.03 BORDERLINE DIABETES: Status: ACTIVE | Noted: 2020-06-03

## 2025-07-09 PROBLEM — R00.0 TACHYCARDIA: Status: ACTIVE | Noted: 2023-05-11

## 2025-07-09 PROBLEM — F41.0 ANXIETY ATTACK: Status: ACTIVE | Noted: 2020-06-03

## 2025-07-09 PROBLEM — R00.2 PALPITATIONS: Status: ACTIVE | Noted: 2021-04-12

## 2025-07-23 ENCOUNTER — HOSPITAL ENCOUNTER (OUTPATIENT)
Dept: MAMMOGRAPHY | Facility: HOSPITAL | Age: 60
Discharge: HOME | End: 2025-07-23
Admitting: OBSTETRICS & GYNECOLOGY
Payer: MEDICARE

## 2025-07-23 ENCOUNTER — OFFICE VISIT (OUTPATIENT)
Dept: SURGERY | Age: 60
End: 2025-07-23
Payer: MEDICARE

## 2025-07-23 VITALS — HEART RATE: 68 BPM | WEIGHT: 160 LBS | OXYGEN SATURATION: 98 % | HEIGHT: 66 IN | BODY MASS INDEX: 25.71 KG/M2

## 2025-07-23 DIAGNOSIS — L30.4 INTERTRIGO: Primary | ICD-10-CM

## 2025-07-23 DIAGNOSIS — Z12.31 VISIT FOR SCREENING MAMMOGRAM: ICD-10-CM

## 2025-07-23 DIAGNOSIS — Z12.31 SCREENING MAMMOGRAM FOR BREAST CANCER: ICD-10-CM

## 2025-07-23 PROCEDURE — G8419 CALC BMI OUT NRM PARAM NOF/U: HCPCS | Performed by: PHYSICIAN ASSISTANT

## 2025-07-23 PROCEDURE — G8427 DOCREV CUR MEDS BY ELIG CLIN: HCPCS | Performed by: PHYSICIAN ASSISTANT

## 2025-07-23 PROCEDURE — 77063 BREAST TOMOSYNTHESIS BI: CPT

## 2025-07-23 PROCEDURE — 1036F TOBACCO NON-USER: CPT | Performed by: PHYSICIAN ASSISTANT

## 2025-07-23 PROCEDURE — 99213 OFFICE O/P EST LOW 20 MIN: CPT | Performed by: PHYSICIAN ASSISTANT

## 2025-07-23 PROCEDURE — 3017F COLORECTAL CA SCREEN DOC REV: CPT | Performed by: PHYSICIAN ASSISTANT

## 2025-07-23 PROCEDURE — 77067 SCR MAMMO BI INCL CAD: CPT

## 2025-07-23 RX ORDER — CLOTRIMAZOLE AND BETAMETHASONE DIPROPIONATE 10; .64 MG/G; MG/G
CREAM TOPICAL
Qty: 1 EACH | Refills: 3 | Status: SHIPPED | OUTPATIENT
Start: 2025-07-23

## 2025-07-23 NOTE — PROGRESS NOTES
Merissa Dubon comes today for her follow-up breast exam.  She has had no new breast complaints.  She reports no new palpable masses.  There is no skin or nipple changes.  There is no nipple discharge.  She has no appreciable evidence of supraclavicular or axillary adenopathy.    Patient Active Problem List    Diagnosis Date Noted    Primary osteoarthritis of left knee 10/28/2024    AVNRT (AV renzo re-entry tachycardia) (HCC) 09/07/2016    Hypothyroidism 09/07/2016    Lobular carcinoma in situ 07/21/2014    Atypical lobular hyperplasia of breast 06/17/2014    Abnormal mammogram 05/23/2014    Mammographic microcalcification 05/23/2014       Current Outpatient Medications   Medication Sig Dispense Refill    rosuvastatin (CRESTOR) 10 MG tablet TAKE 1 ONCE A DAY IN THE EVENING FOR CHOLESTEROL      PROBIOTIC PRODUCT PO Take 1 tablet by mouth daily      meclizine (ANTIVERT) 12.5 MG tablet TAKE ONE TABLET BY MOUTH THREE TIMES A DAY AS NEEDED FOR VERTIGO      metroNIDAZOLE (METROCREAM) 0.75 % cream Apply topically 2 times daily      mupirocin (BACTROBAN) 2 % ointment APPLY 1 APPLICATION TOPICALLY TO THE APPROPRIATE AREA AS DIRECTED 3 (THREE) TIMES A DAY.      ondansetron (ZOFRAN) 4 MG tablet Take 1 tablet by mouth every 8 hours as needed      Psyllium (METAMUCIL) 0.36 g CAPS Take by mouth      JANUVIA 100 MG tablet Take 1 tablet by mouth daily      sertraline (ZOLOFT) 100 MG tablet Take 1 tablet by mouth daily      cetirizine (ZYRTEC) 10 MG tablet Take 1 tablet by mouth daily      fluticasone (FLONASE) 50 MCG/ACT nasal spray USE 1 SPRAY IN EACH NOSTRIL ONCE DAILY AS NEEDED. **60 DAY SUPPLY**      naproxen (NAPROSYN) 500 MG tablet Take 1 tablet by mouth 2 times daily (with meals)      dicyclomine (BENTYL) 20 MG tablet       pantoprazole (PROTONIX) 20 MG tablet Take 1 tablet by mouth daily      ALPRAZolam (XANAX) 0.5 MG tablet nightly as needed       levothyroxine (SYNTHROID) 50 MCG tablet   11    aspirin 81 MG 
yes

## 2025-07-28 ENCOUNTER — TRANSCRIBE ORDERS (OUTPATIENT)
Dept: ADMINISTRATIVE | Facility: HOSPITAL | Age: 60
End: 2025-07-28
Payer: MEDICARE

## 2025-07-28 DIAGNOSIS — R22.1 LOCALIZED SWELLING, MASS AND LUMP, NECK: Primary | ICD-10-CM

## 2025-07-29 NOTE — PROGRESS NOTES
Orthopaedic Clinic Note - Established Patient    NAME:  Merissa Dubon   : 1965  MRN: [id number]    2025    CHIEF COMPLAINT:  follow up left knee pain, repeat injection    HISTORY OF PRESENT ILLNESS:   The patient is a 59 y.o. female who returns today for follow up of left knee pain, requesting repeat injection. It has been 3 months since last injection. Patient denies any recent trauma, fall, or other other injury. Pain is rated a 4/10.    Past Medical History:        Diagnosis Date    Diverticulitis     ER visit 18    Endometriosis     Hyperthyroidism     Hypoglycemia     Osteoarthritis     Panic attack        Past Surgical History:        Procedure Laterality Date    BREAST BIOPSY Left 2014    BREAST SURGERY Right 2005    benign    CARDIAC SURGERY  2020    [facility]    HYSTERECTOMY (CERVIX STATUS UNKNOWN)  2012       Current Medications:   Prior to Admission medications    Medication Sig Start Date End Date Taking? Authorizing Provider   clotrimazole-betamethasone (LOTRISONE) 1-0.05 % cream Apply topically 2 times daily. 25  Yes Nathaniel Coombs PA-C   rosuvastatin (CRESTOR) 10 MG tablet TAKE 1 ONCE A DAY IN THE EVENING FOR CHOLESTEROL 25  Yes Emmanuel Caal MD   PROBIOTIC PRODUCT PO Take 1 tablet by mouth daily   Yes Emmanuel Caal MD   meclizine (ANTIVERT) 12.5 MG tablet TAKE ONE TABLET BY MOUTH THREE TIMES A DAY AS NEEDED FOR VERTIGO   Yes Emmanuel Caal MD   metroNIDAZOLE (METROCREAM) 0.75 % cream Apply topically 2 times daily   Yes Emmanuel Caal MD   mupirocin (BACTROBAN) 2 % ointment APPLY 1 APPLICATION TOPICALLY TO THE APPROPRIATE AREA AS DIRECTED 3 (THREE) TIMES A DAY.   Yes Emmanuel Caal MD   ondansetron (ZOFRAN) 4 MG tablet Take 1 tablet by mouth every 8 hours as needed   Yes Emmanuel Caal MD   Psyllium (METAMUCIL) 0.36 g CAPS Take by mouth   Yes Emmanuel Caal MD   JANUVIA 100 MG

## 2025-07-30 ENCOUNTER — TRANSCRIBE ORDERS (OUTPATIENT)
Dept: ADMINISTRATIVE | Facility: HOSPITAL | Age: 60
End: 2025-07-30
Payer: MEDICARE

## 2025-07-30 DIAGNOSIS — Z12.31 VISIT FOR SCREENING MAMMOGRAM: Primary | ICD-10-CM

## 2025-07-31 ENCOUNTER — OFFICE VISIT (OUTPATIENT)
Age: 60
End: 2025-07-31

## 2025-07-31 VITALS — BODY MASS INDEX: 27.64 KG/M2 | HEIGHT: 66 IN | WEIGHT: 172 LBS

## 2025-07-31 DIAGNOSIS — M17.12 PRIMARY OSTEOARTHRITIS OF LEFT KNEE: Primary | ICD-10-CM

## 2025-07-31 RX ORDER — LIDOCAINE HYDROCHLORIDE 10 MG/ML
2 INJECTION, SOLUTION INFILTRATION; PERINEURAL ONCE
Status: COMPLETED | OUTPATIENT
Start: 2025-07-31 | End: 2025-07-31

## 2025-07-31 RX ORDER — ROPIVACAINE HYDROCHLORIDE 5 MG/ML
2 INJECTION, SOLUTION EPIDURAL; INFILTRATION; PERINEURAL ONCE
Status: COMPLETED | OUTPATIENT
Start: 2025-07-31 | End: 2025-07-31

## 2025-07-31 RX ORDER — TRIAMCINOLONE ACETONIDE 40 MG/ML
40 INJECTION, SUSPENSION INTRA-ARTICULAR; INTRAMUSCULAR ONCE
Status: COMPLETED | OUTPATIENT
Start: 2025-07-31 | End: 2025-07-31

## 2025-07-31 RX ADMIN — ROPIVACAINE HYDROCHLORIDE 2 ML: 5 INJECTION, SOLUTION EPIDURAL; INFILTRATION; PERINEURAL at 08:39

## 2025-07-31 RX ADMIN — TRIAMCINOLONE ACETONIDE 40 MG: 40 INJECTION, SUSPENSION INTRA-ARTICULAR; INTRAMUSCULAR at 08:39

## 2025-07-31 RX ADMIN — LIDOCAINE HYDROCHLORIDE 2 ML: 10 INJECTION, SOLUTION INFILTRATION; PERINEURAL at 08:38

## 2025-08-15 ENCOUNTER — HOSPITAL ENCOUNTER (OUTPATIENT)
Dept: ULTRASOUND IMAGING | Facility: HOSPITAL | Age: 60
Discharge: HOME | End: 2025-08-15
Payer: MEDICARE

## 2025-08-15 DIAGNOSIS — R22.1 LOCALIZED SWELLING, MASS AND LUMP, NECK: ICD-10-CM

## 2025-08-15 PROCEDURE — 76536 US EXAM OF HEAD AND NECK: CPT

## (undated) DEVICE — Device: Brand: DEFENDO AIR/WATER/SUCTION AND BIOPSY VALVE

## (undated) DEVICE — SENSR O2 OXIMAX FNGR A/ 18IN NONSTR

## (undated) DEVICE — ENDOGATOR AUXILIARY WATER JET CONNECTOR: Brand: ENDOGATOR

## (undated) DEVICE — MASK,OXYGEN,MED CONC,ADLT,7' TUB, UC: Brand: PENDING

## (undated) DEVICE — YANKAUER,BULB TIP WITH VENT: Brand: ARGYLE

## (undated) DEVICE — TBG SMPL FLTR LINE NASL 02/C02 A/ BX/100

## (undated) DEVICE — THE CHANNEL CLEANING BRUSH IS A NYLON FLEXI BRUSH ATTACHED TO A FLEXIBLE PLASTIC SHEATH DESIGNED TO SAFELY REMOVE DEBRIS FROM FLEXIBLE ENDOSCOPES.

## (undated) DEVICE — CUFF,BP,DISP,1 TUBE,ADULT,HP: Brand: MEDLINE